# Patient Record
Sex: FEMALE | Race: WHITE | NOT HISPANIC OR LATINO
[De-identification: names, ages, dates, MRNs, and addresses within clinical notes are randomized per-mention and may not be internally consistent; named-entity substitution may affect disease eponyms.]

---

## 2018-07-02 ENCOUNTER — APPOINTMENT (OUTPATIENT)
Dept: ORTHOPEDIC SURGERY | Facility: CLINIC | Age: 63
End: 2018-07-02
Payer: COMMERCIAL

## 2018-07-02 VITALS
HEIGHT: 64 IN | DIASTOLIC BLOOD PRESSURE: 81 MMHG | SYSTOLIC BLOOD PRESSURE: 121 MMHG | BODY MASS INDEX: 32.44 KG/M2 | HEART RATE: 69 BPM | WEIGHT: 190 LBS

## 2018-07-02 PROCEDURE — 20611 DRAIN/INJ JOINT/BURSA W/US: CPT | Mod: RT

## 2018-08-06 ENCOUNTER — APPOINTMENT (OUTPATIENT)
Dept: ORTHOPEDIC SURGERY | Facility: CLINIC | Age: 63
End: 2018-08-06
Payer: COMMERCIAL

## 2018-08-06 VITALS
HEART RATE: 77 BPM | HEIGHT: 64 IN | DIASTOLIC BLOOD PRESSURE: 81 MMHG | BODY MASS INDEX: 32.44 KG/M2 | SYSTOLIC BLOOD PRESSURE: 122 MMHG | WEIGHT: 190 LBS

## 2018-08-06 DIAGNOSIS — Z82.61 FAMILY HISTORY OF ARTHRITIS: ICD-10-CM

## 2018-08-06 DIAGNOSIS — Z87.39 PERSONAL HISTORY OF OTHER DISEASES OF THE MUSCULOSKELETAL SYSTEM AND CONNECTIVE TISSUE: ICD-10-CM

## 2018-08-06 DIAGNOSIS — M54.5 LOW BACK PAIN: ICD-10-CM

## 2018-08-06 DIAGNOSIS — Z86.79 PERSONAL HISTORY OF OTHER DISEASES OF THE CIRCULATORY SYSTEM: ICD-10-CM

## 2018-08-06 PROCEDURE — 72100 X-RAY EXAM L-S SPINE 2/3 VWS: CPT

## 2018-08-06 PROCEDURE — 99214 OFFICE O/P EST MOD 30 MIN: CPT

## 2018-08-06 PROCEDURE — 73522 X-RAY EXAM HIPS BI 3-4 VIEWS: CPT

## 2018-08-06 RX ORDER — LISINOPRIL 10 MG/1
10 TABLET ORAL
Refills: 0 | Status: ACTIVE | COMMUNITY

## 2019-01-15 ENCOUNTER — APPOINTMENT (OUTPATIENT)
Dept: ORTHOPEDIC SURGERY | Facility: CLINIC | Age: 64
End: 2019-01-15
Payer: COMMERCIAL

## 2019-01-15 VITALS
HEIGHT: 64 IN | DIASTOLIC BLOOD PRESSURE: 75 MMHG | BODY MASS INDEX: 32.44 KG/M2 | SYSTOLIC BLOOD PRESSURE: 169 MMHG | WEIGHT: 190 LBS | HEART RATE: 90 BPM

## 2019-01-15 PROCEDURE — 73030 X-RAY EXAM OF SHOULDER: CPT | Mod: LT

## 2019-01-15 PROCEDURE — 20610 DRAIN/INJ JOINT/BURSA W/O US: CPT | Mod: LT

## 2019-01-15 PROCEDURE — 99213 OFFICE O/P EST LOW 20 MIN: CPT | Mod: 25

## 2019-01-15 RX ORDER — DICLOFENAC SODIUM 75 MG/1
75 TABLET, DELAYED RELEASE ORAL
Qty: 1 | Refills: 0 | Status: ACTIVE | COMMUNITY
Start: 2019-01-15 | End: 1900-01-01

## 2019-01-15 NOTE — PHYSICAL EXAM
[UE/LE] : Sensory: Intact in bilateral upper & lower extremities [ALL] : dorsalis pedis, posterior tibial, femoral, popliteal, and radial 2+ and symmetric bilaterally [Normal] : Oriented to person, place, and time, insight and judgement were intact and the affect was normal [Poor Appearance] : well-appearing [Acute Distress] : not in acute distress [de-identified] : Skin Warm, dry, no erythema, no warmth, no swelling, no lesions\par \par Tenderness - lateral shoulder\par \par ROM \par Flexion -120 degrees\par Abduction 120 degrees\par \par Neer Test - Negative \par Peterson Test - Negative\par Cross Body Adduction Test - Negative \par Speed Test - Negative \par Yergason Sign - Negative \par O'briens Test - Negative \par Supraspinatus Stress Test - pain  \par Drop Arm Test - Negative \par External Rotation Strength - Negative \par Lift - Off Test Negative \par \par Sensation to touch intact to lateral to axillary and musculocutaneous nerve, distal motor function intact to elbow, wrist and hand \par pulse intact, cap refill intact\par  [de-identified] : 3 view left shoulder xray reveals no acute findings

## 2019-01-15 NOTE — PROCEDURE
[de-identified] : After full discussion of treatment alternatives, and associated risks, complication, limitations, and expectations, and with patient consent, a steroid injection was administered. Following sterile prep with sterile method, 40 mg of Depo-Medrol + 5 cc of 1% lidocaine were injected into the subacromial space of the left shoulder and was well tolerated without complication.\par

## 2019-01-15 NOTE — DISCUSSION/SUMMARY
[de-identified] : Discussion had with patient, will start NSAIDS \par Patient will follow up with sports after PT\par Patient aware must follow up with MD for further eval and treatment \par Patient will start Physical Therapy \par Patients questions were answered and patient is satisfied with today's visit\par

## 2019-01-15 NOTE — HISTORY OF PRESENT ILLNESS
[Pain Location] : pain [de-identified] : Patient is a 63 year old female who presents c/o left shoulder pain x 2-3 weeks. patient denies any injury that started pain. patient locates pain to lateral shoulder, states feels pain with motion. no radiation of pain,no weakness. patient has not had any treatment to this point

## 2019-02-18 ENCOUNTER — APPOINTMENT (OUTPATIENT)
Dept: ORTHOPEDIC SURGERY | Facility: CLINIC | Age: 64
End: 2019-02-18
Payer: COMMERCIAL

## 2019-02-18 ENCOUNTER — RESULT REVIEW (OUTPATIENT)
Age: 64
End: 2019-02-18

## 2019-02-18 VITALS — BODY MASS INDEX: 32.44 KG/M2 | WEIGHT: 190 LBS | HEIGHT: 64 IN

## 2019-02-18 PROCEDURE — 99214 OFFICE O/P EST MOD 30 MIN: CPT

## 2019-02-20 ENCOUNTER — FORM ENCOUNTER (OUTPATIENT)
Age: 64
End: 2019-02-20

## 2019-02-21 ENCOUNTER — APPOINTMENT (OUTPATIENT)
Dept: MRI IMAGING | Facility: CLINIC | Age: 64
End: 2019-02-21
Payer: COMMERCIAL

## 2019-02-21 ENCOUNTER — OUTPATIENT (OUTPATIENT)
Dept: OUTPATIENT SERVICES | Facility: HOSPITAL | Age: 64
LOS: 1 days | End: 2019-02-21
Payer: COMMERCIAL

## 2019-02-21 DIAGNOSIS — Z00.8 ENCOUNTER FOR OTHER GENERAL EXAMINATION: ICD-10-CM

## 2019-02-21 PROCEDURE — 73221 MRI JOINT UPR EXTREM W/O DYE: CPT

## 2019-02-21 PROCEDURE — 73221 MRI JOINT UPR EXTREM W/O DYE: CPT | Mod: 26,LT

## 2019-02-28 ENCOUNTER — APPOINTMENT (OUTPATIENT)
Dept: ORTHOPEDIC SURGERY | Facility: CLINIC | Age: 64
End: 2019-02-28
Payer: COMMERCIAL

## 2019-02-28 ENCOUNTER — TRANSCRIPTION ENCOUNTER (OUTPATIENT)
Age: 64
End: 2019-02-28

## 2019-02-28 VITALS — WEIGHT: 190 LBS | BODY MASS INDEX: 32.44 KG/M2 | HEIGHT: 64 IN

## 2019-02-28 DIAGNOSIS — M75.80 OTHER SHOULDER LESIONS, UNSPECIFIED SHOULDER: ICD-10-CM

## 2019-02-28 DIAGNOSIS — S82.841A DISPLACED BIMALLEOLAR FRACTURE OF RIGHT LOWER LEG, INITIAL ENCOUNTER FOR CLOSED FRACTURE: ICD-10-CM

## 2019-02-28 DIAGNOSIS — M75.02 ADHESIVE CAPSULITIS OF LEFT SHOULDER: ICD-10-CM

## 2019-02-28 PROCEDURE — ZZZZZ: CPT

## 2019-02-28 PROCEDURE — 73610 X-RAY EXAM OF ANKLE: CPT | Mod: RT

## 2019-02-28 PROCEDURE — 73562 X-RAY EXAM OF KNEE 3: CPT | Mod: RT

## 2019-02-28 PROCEDURE — 29425 APPL SHORT LEG CAST WALKING: CPT | Mod: 59,RT

## 2019-02-28 PROCEDURE — 20611 DRAIN/INJ JOINT/BURSA W/US: CPT | Mod: 59,LT

## 2019-02-28 PROCEDURE — 99214 OFFICE O/P EST MOD 30 MIN: CPT | Mod: 25,57

## 2019-02-28 NOTE — PHYSICAL EXAM
[de-identified] : Constitutional\par o Appearance : well-nourished, well developed, alert, in no acute distress \par Head and Face\par o Head :\par ¦ Inspection : atraumatic, normocephalic\par o Face :\par ¦ Inspection : no visible rash or discoloration\par Lymphatic\par o Epitrochlear Nodes : no lymphadenopathy \par o Popliteal Nodes : no palpable nodes present \par o Supraclavicular Nodes : no supraclavicular nodes present \par o Preauricular Nodes : no preauricular nodes present \par o Additional Nodes : no additional adenopathy present \par Skin and Subcutaneous Tissue \par o Head and Neck :\par ¦ Face : no facial lesions \par o Trunk :\par ¦ Back : no rashes present, no lesions present, no areas of discoloration \par Body Hair : body hair distribution normal for age\par Neurologic\par o Mental Status Examination :\par ¦ Orientation : alert and oriented X 3\par o Coordination : finger-to-nose-to-finger testing normal bilaterally, heel-shin cerebellar test within normal limits bilaterally \par Psychiatric\par o Mood and Affect: mood normal, affect appropriate \par Cardiovascular\par o Peripheral Vascular System :\par ¦ Femoral Artery : pulse 2+\par ¦ Dorsalis Pedis Artery : pulse 2+\par ¦ Posterior Tibial Artery : pulse 2+\par \par Right Lower Extremity\par o Knee :\par ¦ Inspection/Palpation : significant medial compartment tenderness to palpation, no swelling\par ¦ Range of Motion : active flexion and extension full and painless, no crepitance\par ¦ Stability : no valgus or varus instability present on provocative testing\par ¦ Strength : flexion and extension 4/5\par ¦ Tests and Signs : all tests for stability normal \par \par o Ankle :\par ¦ Inspection/Palpation : medial and lateral clearspace tenderness to palpation, early ecchymosis of the ankle\par ¦ Range of Motion : pain with range of motion, full motion of her toes\par ¦ Stability : no joint instability on provocative testing\par ¦ Strength : strength not tested\par o Skin : no erythema or ecchymosis present\par o Sensation : sensation to pin intact \par \par Left Lower Extremity\par o Knee :\par ¦ Inspection/Palpation : no tenderness to palpation, no swelling\par ¦ Range of Motion : active flexion and extension full and painless, no crepitance\par ¦ Stability : no valgus or varus instability present on provocative testing\par ¦ Strength : flexion and extension 5/5\par ¦ Tests and Signs : all tests for stability normal \par  \par o Ankle :\par no swelling all planes\par ¦ Inspection/Palpation : no tenderness to palpation,\par ¦ Range of Motion : arc of motion full and painless in\par ¦ Stability : no joint instability on provocative testing\par ¦ Strength : all muscles 5/5\par o Skin : no erythema or ecchymosis present\par o Sensation : sensation to pin intact \par \par Gait and Station:\par Gait: walks with a cane in the left hand, swelling about the right ankle\par  antalgic on the right.\par Radiology:\par o Right Knee: Standing AP, lateral, and tunnel views were obtained and reveal severe bone-on-bone in the medial compartment with severe patellofemoral osteoarthritis . There is no evidence of fracture.\par o Right Ankle : AP, lateral and Mortise views were obtained and reveal evulsion of the medial malleolus and a lateral malleolus fracture\par

## 2019-02-28 NOTE — HISTORY OF PRESENT ILLNESS
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 She suffered a twisting of her right ankle and injured her right knee. She can put weight onto her right leg, but it is painful with full weight-bearing. She immediately noticed swelling about the ankle after the fall. She is unable to comfortably drive and has been walking with a cane in her left hand. She is feeling pain in her right knee and ankle and is concerned about a fracture.

## 2019-02-28 NOTE — DISCUSSION/SUMMARY
[de-identified] : For her ankle fracture, she will see Dr. Wiley for an ORIF of her right ankle in 1 day in the ER. She has consented to the surgery. The risks and benefits of the procedure was discussed at length with her. In the interim, she needs to elevate her right leg and apply ice to her ankle. She may take Tylenol for pain, but no steroidal anti-inflammatories. A posterior mold was provided and she will continue to wear it until she can get her fracture reduced. She is to limit her walking.

## 2019-02-28 NOTE — ADDENDUM
[FreeTextEntry1] : I, Jose Sim , acted solely as a scribe for Dr. Aron Gutiérrez on this date 02/28/2019.\par All medical record entries made by the Scribe were at my, Dr. Aorn Gutiérrez, direction and personally dictated by me on 02/28/2019. I have reviewed the chart and agree that the record accurately reflects my personal performance of the history, physical exam, assessment and plan. I have also personally directed, reviewed, and agreed with the chart.

## 2019-03-01 ENCOUNTER — INPATIENT (INPATIENT)
Facility: HOSPITAL | Age: 64
LOS: 1 days | Discharge: HOME | DRG: 494 | End: 2019-03-03
Attending: SPECIALIST | Admitting: SPECIALIST
Payer: COMMERCIAL

## 2019-03-01 VITALS
WEIGHT: 190.04 LBS | DIASTOLIC BLOOD PRESSURE: 68 MMHG | RESPIRATION RATE: 18 BRPM | OXYGEN SATURATION: 98 % | HEART RATE: 77 BPM | SYSTOLIC BLOOD PRESSURE: 164 MMHG | HEIGHT: 64 IN | TEMPERATURE: 98 F

## 2019-03-01 DIAGNOSIS — Z98.890 OTHER SPECIFIED POSTPROCEDURAL STATES: Chronic | ICD-10-CM

## 2019-03-01 DIAGNOSIS — S82.891A OTHER FRACTURE OF RIGHT LOWER LEG, INITIAL ENCOUNTER FOR CLOSED FRACTURE: ICD-10-CM

## 2019-03-01 LAB
ALLERGY+IMMUNOLOGY DIAG STUDY NOTE: SIGNIFICANT CHANGE UP
ANION GAP SERPL CALC-SCNC: 11 MMOL/L — SIGNIFICANT CHANGE UP (ref 5–17)
APTT BLD: 28.3 SEC — LOW (ref 28.5–37)
BASOPHILS # BLD AUTO: 0.02 K/UL — SIGNIFICANT CHANGE UP (ref 0–0.2)
BASOPHILS NFR BLD AUTO: 0.1 % — SIGNIFICANT CHANGE UP (ref 0–2)
BUN SERPL-MCNC: 23 MG/DL — SIGNIFICANT CHANGE UP (ref 7–23)
CALCIUM SERPL-MCNC: 9.4 MG/DL — SIGNIFICANT CHANGE UP (ref 8.4–10.5)
CHLORIDE SERPL-SCNC: 104 MMOL/L — SIGNIFICANT CHANGE UP (ref 96–108)
CO2 SERPL-SCNC: 26 MMOL/L — SIGNIFICANT CHANGE UP (ref 22–31)
CREAT SERPL-MCNC: 0.78 MG/DL — SIGNIFICANT CHANGE UP (ref 0.5–1.3)
EOSINOPHIL # BLD AUTO: 0.01 K/UL — SIGNIFICANT CHANGE UP (ref 0–0.5)
EOSINOPHIL NFR BLD AUTO: 0.1 % — SIGNIFICANT CHANGE UP (ref 0–6)
GLUCOSE SERPL-MCNC: 129 MG/DL — HIGH (ref 70–99)
HCT VFR BLD CALC: 40.5 % — SIGNIFICANT CHANGE UP (ref 34.5–45)
HGB BLD-MCNC: 14 G/DL — SIGNIFICANT CHANGE UP (ref 11.5–15.5)
IMM GRANULOCYTES NFR BLD AUTO: 0.3 % — SIGNIFICANT CHANGE UP (ref 0–1.5)
INR BLD: 1.19 RATIO — HIGH (ref 0.88–1.16)
LYMPHOCYTES # BLD AUTO: 1.85 K/UL — SIGNIFICANT CHANGE UP (ref 1–3.3)
LYMPHOCYTES # BLD AUTO: 12.9 % — LOW (ref 13–44)
MCHC RBC-ENTMCNC: 32.9 PG — SIGNIFICANT CHANGE UP (ref 27–34)
MCHC RBC-ENTMCNC: 34.6 GM/DL — SIGNIFICANT CHANGE UP (ref 32–36)
MCV RBC AUTO: 95.1 FL — SIGNIFICANT CHANGE UP (ref 80–100)
MONOCYTES # BLD AUTO: 0.76 K/UL — SIGNIFICANT CHANGE UP (ref 0–0.9)
MONOCYTES NFR BLD AUTO: 5.3 % — SIGNIFICANT CHANGE UP (ref 2–14)
NEUTROPHILS # BLD AUTO: 11.64 K/UL — HIGH (ref 1.8–7.4)
NEUTROPHILS NFR BLD AUTO: 81.3 % — HIGH (ref 43–77)
NRBC # BLD: 0 /100 WBCS — SIGNIFICANT CHANGE UP (ref 0–0)
PLATELET # BLD AUTO: 306 K/UL — SIGNIFICANT CHANGE UP (ref 150–400)
POTASSIUM SERPL-MCNC: 4.1 MMOL/L — SIGNIFICANT CHANGE UP (ref 3.5–5.3)
POTASSIUM SERPL-SCNC: 4.1 MMOL/L — SIGNIFICANT CHANGE UP (ref 3.5–5.3)
PROTHROM AB SERPL-ACNC: 13 SEC — HIGH (ref 10–12.9)
RBC # BLD: 4.26 M/UL — SIGNIFICANT CHANGE UP (ref 3.8–5.2)
RBC # FLD: 12.2 % — SIGNIFICANT CHANGE UP (ref 10.3–14.5)
SODIUM SERPL-SCNC: 141 MMOL/L — SIGNIFICANT CHANGE UP (ref 135–145)
WBC # BLD: 14.33 K/UL — HIGH (ref 3.8–10.5)
WBC # FLD AUTO: 14.33 K/UL — HIGH (ref 3.8–10.5)

## 2019-03-01 PROCEDURE — 93010 ELECTROCARDIOGRAM REPORT: CPT

## 2019-03-01 PROCEDURE — 99285 EMERGENCY DEPT VISIT HI MDM: CPT

## 2019-03-01 PROCEDURE — 99223 1ST HOSP IP/OBS HIGH 75: CPT

## 2019-03-01 PROCEDURE — 27814 TREATMENT OF ANKLE FRACTURE: CPT | Mod: RT

## 2019-03-01 PROCEDURE — 71045 X-RAY EXAM CHEST 1 VIEW: CPT | Mod: 26

## 2019-03-01 RX ORDER — OXYCODONE AND ACETAMINOPHEN 5; 325 MG/1; MG/1
1 TABLET ORAL EVERY 4 HOURS
Qty: 0 | Refills: 0 | Status: DISCONTINUED | OUTPATIENT
Start: 2019-03-01 | End: 2019-03-01

## 2019-03-01 RX ORDER — SODIUM CHLORIDE 9 MG/ML
1000 INJECTION, SOLUTION INTRAVENOUS
Qty: 0 | Refills: 0 | Status: DISCONTINUED | OUTPATIENT
Start: 2019-03-01 | End: 2019-03-01

## 2019-03-01 RX ORDER — CELECOXIB 200 MG/1
200 CAPSULE ORAL EVERY 12 HOURS
Qty: 0 | Refills: 0 | Status: DISCONTINUED | OUTPATIENT
Start: 2019-03-01 | End: 2019-03-03

## 2019-03-01 RX ORDER — HYDROMORPHONE HYDROCHLORIDE 2 MG/ML
0.5 INJECTION INTRAMUSCULAR; INTRAVENOUS; SUBCUTANEOUS EVERY 6 HOURS
Qty: 0 | Refills: 0 | Status: DISCONTINUED | OUTPATIENT
Start: 2019-03-01 | End: 2019-03-03

## 2019-03-01 RX ORDER — ONDANSETRON 8 MG/1
4 TABLET, FILM COATED ORAL ONCE
Qty: 0 | Refills: 0 | Status: DISCONTINUED | OUTPATIENT
Start: 2019-03-01 | End: 2019-03-01

## 2019-03-01 RX ORDER — HYDROMORPHONE HYDROCHLORIDE 2 MG/ML
0.5 INJECTION INTRAMUSCULAR; INTRAVENOUS; SUBCUTANEOUS
Qty: 0 | Refills: 0 | Status: DISCONTINUED | OUTPATIENT
Start: 2019-03-01 | End: 2019-03-01

## 2019-03-01 RX ORDER — MORPHINE SULFATE 50 MG/1
4 CAPSULE, EXTENDED RELEASE ORAL ONCE
Qty: 0 | Refills: 0 | Status: DISCONTINUED | OUTPATIENT
Start: 2019-03-01 | End: 2019-03-01

## 2019-03-01 RX ORDER — OXYCODONE HYDROCHLORIDE 5 MG/1
10 TABLET ORAL EVERY 4 HOURS
Qty: 0 | Refills: 0 | Status: DISCONTINUED | OUTPATIENT
Start: 2019-03-01 | End: 2019-03-03

## 2019-03-01 RX ORDER — OXYCODONE HYDROCHLORIDE 5 MG/1
5 TABLET ORAL EVERY 4 HOURS
Qty: 0 | Refills: 0 | Status: DISCONTINUED | OUTPATIENT
Start: 2019-03-01 | End: 2019-03-03

## 2019-03-01 RX ORDER — APREPITANT 80 MG/1
40 CAPSULE ORAL ONCE
Qty: 0 | Refills: 0 | Status: COMPLETED | OUTPATIENT
Start: 2019-03-01 | End: 2019-03-01

## 2019-03-01 RX ORDER — ACETAMINOPHEN 500 MG
1000 TABLET ORAL ONCE
Qty: 0 | Refills: 0 | Status: COMPLETED | OUTPATIENT
Start: 2019-03-01 | End: 2019-03-01

## 2019-03-01 RX ORDER — SODIUM CHLORIDE 9 MG/ML
3 INJECTION INTRAMUSCULAR; INTRAVENOUS; SUBCUTANEOUS ONCE
Qty: 0 | Refills: 0 | Status: COMPLETED | OUTPATIENT
Start: 2019-03-01 | End: 2019-03-01

## 2019-03-01 RX ORDER — DOCUSATE SODIUM 100 MG
100 CAPSULE ORAL THREE TIMES A DAY
Qty: 0 | Refills: 0 | Status: DISCONTINUED | OUTPATIENT
Start: 2019-03-01 | End: 2019-03-03

## 2019-03-01 RX ORDER — CEFAZOLIN SODIUM 1 G
2000 VIAL (EA) INJECTION EVERY 8 HOURS
Qty: 0 | Refills: 0 | Status: COMPLETED | OUTPATIENT
Start: 2019-03-02 | End: 2019-03-02

## 2019-03-01 RX ORDER — ENOXAPARIN SODIUM 100 MG/ML
40 INJECTION SUBCUTANEOUS EVERY 24 HOURS
Qty: 0 | Refills: 0 | Status: DISCONTINUED | OUTPATIENT
Start: 2019-03-02 | End: 2019-03-03

## 2019-03-01 RX ORDER — SODIUM CHLORIDE 9 MG/ML
1000 INJECTION, SOLUTION INTRAVENOUS
Qty: 0 | Refills: 0 | Status: DISCONTINUED | OUTPATIENT
Start: 2019-03-01 | End: 2019-03-03

## 2019-03-01 RX ORDER — ONDANSETRON 8 MG/1
4 TABLET, FILM COATED ORAL EVERY 6 HOURS
Qty: 0 | Refills: 0 | Status: DISCONTINUED | OUTPATIENT
Start: 2019-03-01 | End: 2019-03-03

## 2019-03-01 RX ORDER — LISINOPRIL 2.5 MG/1
10 TABLET ORAL DAILY
Qty: 0 | Refills: 0 | Status: DISCONTINUED | OUTPATIENT
Start: 2019-03-01 | End: 2019-03-03

## 2019-03-01 RX ORDER — CHLORHEXIDINE GLUCONATE 213 G/1000ML
1 SOLUTION TOPICAL DAILY
Qty: 0 | Refills: 0 | Status: DISCONTINUED | OUTPATIENT
Start: 2019-03-01 | End: 2019-03-03

## 2019-03-01 RX ORDER — ALPRAZOLAM 0.25 MG
1 TABLET ORAL
Qty: 0 | Refills: 0 | COMMUNITY

## 2019-03-01 RX ORDER — CEFAZOLIN SODIUM 1 G
2000 VIAL (EA) INJECTION ONCE
Qty: 0 | Refills: 0 | Status: COMPLETED | OUTPATIENT
Start: 2019-03-01 | End: 2019-03-01

## 2019-03-01 RX ORDER — SODIUM CHLORIDE 9 MG/ML
1000 INJECTION, SOLUTION INTRAVENOUS
Qty: 0 | Refills: 0 | Status: DISCONTINUED | OUTPATIENT
Start: 2019-03-01 | End: 2019-03-02

## 2019-03-01 RX ORDER — ALPRAZOLAM 0.25 MG
0.25 TABLET ORAL THREE TIMES A DAY
Qty: 0 | Refills: 0 | Status: DISCONTINUED | OUTPATIENT
Start: 2019-03-01 | End: 2019-03-03

## 2019-03-01 RX ADMIN — MORPHINE SULFATE 4 MILLIGRAM(S): 50 CAPSULE, EXTENDED RELEASE ORAL at 09:36

## 2019-03-01 RX ADMIN — SODIUM CHLORIDE 125 MILLILITER(S): 9 INJECTION, SOLUTION INTRAVENOUS at 18:37

## 2019-03-01 RX ADMIN — Medication 100 MILLIGRAM(S): at 20:21

## 2019-03-01 RX ADMIN — SODIUM CHLORIDE 120 MILLILITER(S): 9 INJECTION, SOLUTION INTRAVENOUS at 09:36

## 2019-03-01 RX ADMIN — CELECOXIB 200 MILLIGRAM(S): 200 CAPSULE ORAL at 20:20

## 2019-03-01 RX ADMIN — APREPITANT 40 MILLIGRAM(S): 80 CAPSULE ORAL at 14:52

## 2019-03-01 RX ADMIN — CELECOXIB 200 MILLIGRAM(S): 200 CAPSULE ORAL at 20:21

## 2019-03-01 RX ADMIN — CHLORHEXIDINE GLUCONATE 1 APPLICATION(S): 213 SOLUTION TOPICAL at 14:52

## 2019-03-01 RX ADMIN — MORPHINE SULFATE 4 MILLIGRAM(S): 50 CAPSULE, EXTENDED RELEASE ORAL at 14:00

## 2019-03-01 RX ADMIN — SODIUM CHLORIDE 120 MILLILITER(S): 9 INJECTION, SOLUTION INTRAVENOUS at 09:25

## 2019-03-01 RX ADMIN — SODIUM CHLORIDE 3 MILLILITER(S): 9 INJECTION INTRAMUSCULAR; INTRAVENOUS; SUBCUTANEOUS at 09:25

## 2019-03-01 NOTE — H&P ADULT - NSHPPHYSICALEXAM_GEN_ALL_CORE
Right ankle: + tenderness medially and laterally tibiotalar joint anteriorly. Early ecchymosis. ROM limited by pain.  FROM of toes.  Brisk capillary refill all digits. Sensation intact to LT.

## 2019-03-01 NOTE — ED ADULT NURSE NOTE - NSIMPLEMENTINTERV_GEN_ALL_ED
Implemented All Fall Risk Interventions:  [place] to call system. Call bell, personal items and telephone within reach. Instruct patient to call for assistance. Room bathroom lighting operational. Non-slip footwear when patient is off stretcher. Physically safe environment: no spills, clutter or unnecessary equipment. Stretcher in lowest position, wheels locked, appropriate side rails in place. Provide visual cue, wrist band, yellow gown, etc. Monitor gait and stability. Monitor for mental status changes and reorient to person, place, and time. Review medications for side effects contributing to fall risk. Reinforce activity limits and safety measures with patient and family.

## 2019-03-01 NOTE — H&P ADULT - HISTORY OF PRESENT ILLNESS
This is a 63 y.o. female who slipped yesterday on ice, while getting into her car, twisting her right ankle.  She went to Dr. Gutiérrez's office afterwards, where x-rays revealed a bimalleolar ankle fx.  She was seen by both Dr. Gutiérrez and Dr. Wiley at the office and was scheduled for ORIF of the right ankle today.

## 2019-03-01 NOTE — ED PROVIDER NOTE - OBJECTIVE STATEMENT
pt s/p trip and fall yesterday, dx with R ankle fracture pt s/p trip and fall yesterday, dx with R ankle fracture yesterday in Dr. Gutiérrez's office, sent in today for surgery of her R ankle by Dr. Wiley.  pt denies pain when not moving, pt states she took the splint off today on her won accord.

## 2019-03-01 NOTE — CONSULT NOTE ADULT - ASSESSMENT
This is a 63 y.o. female who slipped yesterday on ice, while getting into her car, twisting her right ankle.  She went to Dr. Gutiérrez's office afterwards, where x-rays revealed a bimalleolar ankle fx.  She was seen by both Dr. Gutiérrez and Dr. Wiley at the office and was scheduled for ORIF of the right ankle today. Virgilio any LOC, chest pain, palpitation, headache, dizziness. Patient states she did not hit his head.  Patient can walk a long distance without sob.    Closed fracture of right ankle initial encounter, s/p fall    pain meds. DVT prophylaxis as per protocol.  for ORIF of ankle today.    HTN  lisinopril with parameter.    Anxiety  xanax PRN.    Patient is medically optimized with intermediate risk  for surgery today.

## 2019-03-01 NOTE — ED ADULT NURSE REASSESSMENT NOTE - NS ED NURSE REASSESS COMMENT FT1
pt comfortable offers no c/o at present pt pending or call  sister at bedside  to take belongings
pains better after pain meds npo maintained and awaiting or call pt offers no other c/o at present

## 2019-03-01 NOTE — ED PROVIDER NOTE - PHYSICAL EXAMINATION
Gen:  alert, awake, no acute distress  HEENT:  atraumatic head, airway clear, pupils equal and round  CV:  rrr, nl S1, S2, no m/r/g  Pulm:  lungs CTA b/l  Abd: s/nt/nd, +BS  Ext:  moving all extremities, mild R ankle swelling  Neuro:  grossly intact, no focal deficits  Skin:  clear, dry, intact  Psych: AOx3, normal affect, no apparent risk to self or others

## 2019-03-01 NOTE — BRIEF OPERATIVE NOTE - PROCEDURE
<<-----Click on this checkbox to enter Procedure Ankle fracture surgery  03/01/2019  ORIF  Active  COLEEN

## 2019-03-01 NOTE — CONSULT NOTE ADULT - SUBJECTIVE AND OBJECTIVE BOX
Patient is a 63y old  Female who presents with a chief complaint of Right ankle fx (01 Mar 2019 10:24)    This is a 63 y.o. female who slipped yesterday on ice, while getting into her car, twisting her right ankle.  She went to Dr. Gutiérrez's office afterwards, where x-rays revealed a bimalleolar ankle fx.  She was seen by both Dr. Gutiérrez and Dr. Wiley at the office and was scheduled for ORIF of the right ankle today. Virgilio any LOC, chest pain, palpitation, headache, dizziness. Patient states she did not hit his head.      HPI:  Patient is seen and examined.  Virgilio any LOC, chest pain, palpitation, headache, dizziness.  c/o pain in right ankle. h/o right ankle surgery in past. Virgilio any other injury.     PAST MEDICAL & SURGICAL HISTORY:  Anxiety  Essential hypertension  S/P right knee arthroscopy      MEDICATIONS  (STANDING):  dextrose 5% + sodium chloride 0.45%. 1000 milliLiter(s) (120 mL/Hr) IV Continuous <Continuous>    MEDICATIONS  (PRN):      Allergies    No Known Allergies    Intolerances  SOCIAL HISTORY:  Smoker:   NO        PACK YEARS:                         WHEN QUIT?  ETOH use:  YES /social               FREQUENCY / QUANTITY:  Ilicit Drug use:   NO      FAMILY HISTORY:  Mother -A fib.s      Vital Signs Last 24 Hrs  T(C): 36.9 (01 Mar 2019 09:08), Max: 36.9 (01 Mar 2019 08:41)  T(F): 98.4 (01 Mar 2019 09:08), Max: 98.4 (01 Mar 2019 08:41)  HR: 72 (01 Mar 2019 10:03) (72 - 77)  BP: 150/72 (01 Mar 2019 10:03) (150/72 - 164/68)  BP(mean): --  RR: 17 (01 Mar 2019 10:03) (17 - 18)  SpO2: 97% (01 Mar 2019 10:03) (97% - 98%)      LABS:                        14.0   14.33 )-----------( 306      ( 01 Mar 2019 09:03 )             40.5     03-01    141  |  104  |  23  ----------------------------<  129<H>  4.1   |  26  |  0.78    Ca    9.4      01 Mar 2019 09:03      PT/INR - ( 01 Mar 2019 09:03 )   PT: 13.0 sec;   INR: 1.19 ratio         PTT - ( 01 Mar 2019 09:03 )  PTT:28.3 sec    < from: Xray Chest 1 View- PORTABLE-Urgent (03.01.19 @ 09:02) >  EXAM:  XR CHEST PORTABLE URGENT 1V      < end of copied text >  < from: Xray Chest 1 View- PORTABLE-Urgent (03.01.19 @ 09:02) >  MPRESSION:       The lungs are clear. There is no pleural effusion. There is no   pneumothorax.  The cardiac silhouette is within normal limits.  There is   no acute osseous abnormality.    EKG- NSR, no acute ST T wave changes.

## 2019-03-01 NOTE — PRE-OP CHECKLIST - SPO2 (%)
98 I personally performed the services described in the documentation, reviewed the documentation recorded by the scribe in my presence and it accurately and completely records my words and action.

## 2019-03-02 ENCOUNTER — TRANSCRIPTION ENCOUNTER (OUTPATIENT)
Age: 64
End: 2019-03-02

## 2019-03-02 LAB
HCV AB S/CO SERPL IA: 0.2 S/CO — SIGNIFICANT CHANGE UP (ref 0–0.79)
HCV AB SERPL-IMP: SIGNIFICANT CHANGE UP

## 2019-03-02 PROCEDURE — 99233 SBSQ HOSP IP/OBS HIGH 50: CPT

## 2019-03-02 RX ADMIN — Medication 30 MILLILITER(S): at 19:50

## 2019-03-02 RX ADMIN — CELECOXIB 200 MILLIGRAM(S): 200 CAPSULE ORAL at 08:15

## 2019-03-02 RX ADMIN — OXYCODONE HYDROCHLORIDE 5 MILLIGRAM(S): 5 TABLET ORAL at 06:15

## 2019-03-02 RX ADMIN — OXYCODONE HYDROCHLORIDE 5 MILLIGRAM(S): 5 TABLET ORAL at 10:50

## 2019-03-02 RX ADMIN — CELECOXIB 200 MILLIGRAM(S): 200 CAPSULE ORAL at 08:16

## 2019-03-02 RX ADMIN — Medication 100 MILLIGRAM(S): at 08:14

## 2019-03-02 RX ADMIN — ENOXAPARIN SODIUM 40 MILLIGRAM(S): 100 INJECTION SUBCUTANEOUS at 08:15

## 2019-03-02 RX ADMIN — OXYCODONE HYDROCHLORIDE 5 MILLIGRAM(S): 5 TABLET ORAL at 09:54

## 2019-03-02 RX ADMIN — CELECOXIB 200 MILLIGRAM(S): 200 CAPSULE ORAL at 21:14

## 2019-03-02 RX ADMIN — Medication 100 MILLIGRAM(S): at 05:22

## 2019-03-02 RX ADMIN — OXYCODONE HYDROCHLORIDE 5 MILLIGRAM(S): 5 TABLET ORAL at 14:18

## 2019-03-02 RX ADMIN — OXYCODONE HYDROCHLORIDE 5 MILLIGRAM(S): 5 TABLET ORAL at 19:02

## 2019-03-02 RX ADMIN — OXYCODONE HYDROCHLORIDE 5 MILLIGRAM(S): 5 TABLET ORAL at 15:15

## 2019-03-02 RX ADMIN — OXYCODONE HYDROCHLORIDE 5 MILLIGRAM(S): 5 TABLET ORAL at 19:48

## 2019-03-02 RX ADMIN — OXYCODONE HYDROCHLORIDE 10 MILLIGRAM(S): 5 TABLET ORAL at 23:12

## 2019-03-02 RX ADMIN — Medication 100 MILLIGRAM(S): at 21:14

## 2019-03-02 RX ADMIN — OXYCODONE HYDROCHLORIDE 5 MILLIGRAM(S): 5 TABLET ORAL at 05:29

## 2019-03-02 RX ADMIN — Medication 100 MILLIGRAM(S): at 14:18

## 2019-03-02 RX ADMIN — Medication 100 MILLIGRAM(S): at 00:07

## 2019-03-02 RX ADMIN — CELECOXIB 200 MILLIGRAM(S): 200 CAPSULE ORAL at 21:12

## 2019-03-02 NOTE — PROGRESS NOTE ADULT - SUBJECTIVE AND OBJECTIVE BOX
Patient is a 63y old  Female who presents with a chief complaint of Right ankle fx (01 Mar 2019 12:24)      INTERVAL HPI/OVERNIGHT EVENTS:  Pt is seen and examined.  Pain is controlled.    Pain Location & Control:     MEDICATIONS  (STANDING):  celecoxib 200 milliGRAM(s) Oral every 12 hours  chlorhexidine 2% Cloths 1 Application(s) Topical daily  dextrose 5% + sodium chloride 0.45%. 1000 milliLiter(s) (120 mL/Hr) IV Continuous <Continuous>  docusate sodium 100 milliGRAM(s) Oral three times a day  enoxaparin Injectable 40 milliGRAM(s) SubCutaneous every 24 hours  lactated ringers. 1000 milliLiter(s) (125 mL/Hr) IV Continuous <Continuous>  lisinopril 10 milliGRAM(s) Oral daily    MEDICATIONS  (PRN):  ALPRAZolam 0.25 milliGRAM(s) Oral three times a day PRN Anxiety  aluminum hydroxide/magnesium hydroxide/simethicone Suspension 30 milliLiter(s) Oral four times a day PRN Indigestion  HYDROmorphone  Injectable 0.5 milliGRAM(s) IV Push every 6 hours PRN Severe Pain (7 - 10)  ondansetron Injectable 4 milliGRAM(s) IV Push every 6 hours PRN Nausea and/or Vomiting  oxyCODONE    IR 10 milliGRAM(s) Oral every 4 hours PRN Moderate Pain (4 - 6)  oxyCODONE    IR 5 milliGRAM(s) Oral every 4 hours PRN Mild Pain (1 - 3)      Allergies    No Known Allergies    Intolerances    Vital Signs Last 24 Hrs  T(C): 36.9 (02 Mar 2019 08:31), Max: 37.1 (01 Mar 2019 18:04)  T(F): 98.4 (02 Mar 2019 08:31), Max: 98.8 (01 Mar 2019 23:30)  HR: 64 (02 Mar 2019 08:31) (62 - 82)  BP: 131/71 (02 Mar 2019 08:31) (95/56 - 142/85)  BP(mean): --  RR: 16 (02 Mar 2019 08:31) (13 - 17)  SpO2: 94% (02 Mar 2019 08:31) (92% - 98%)        LABS:      Ca    9.4        01 Mar 2019 09:03      PT/INR - ( 01 Mar 2019 09:03 )   PT: 13.0 sec;   INR: 1.19 ratio         PTT - ( 01 Mar 2019 09:03 )  PTT:28.3 sec    CAPILLARY BLOOD GLUCOSE        RADIOLOGY & ADDITIONAL TESTS:    Imaging Personally Reviewed:  [ ] YES  [ ] NO    Consultant(s) Notes Reviewed:  [ x] YES  [ ] NO    Care Discussed with Consultants/Other Providers [x ] YES  [ ] NO

## 2019-03-02 NOTE — DISCHARGE NOTE ADULT - CARE PROVIDER_API CALL
Fox Wiley)  Orthopaedic Surgery  [address]  [place] [address]  Phone: [phone]  Fax: [phone]  Follow Up Time:

## 2019-03-02 NOTE — PROGRESS NOTE ADULT - SUBJECTIVE AND OBJECTIVE BOX
Post Op Day # 1    SUBJECTIVE    64yo Female s/p ORIF right ankle  Patient is alert and comfortable.  Pain well controlled on current pain regimen.  Denies chest pain, shortness of breath, nausea, vomiting.  No complaints at this time.    OBJECTIVE    Vital Signs Last 24 Hrs  T(C): 36.9 (02 Mar 2019 08:31), Max: 37.1 (01 Mar 2019 18:04)  T(F): 98.4 (02 Mar 2019 08:31), Max: 98.8 (01 Mar 2019 23:30)  HR: 64 (02 Mar 2019 08:31) (62 - 82)  BP: 131/71 (02 Mar 2019 08:31) (95/56 - 142/85)  BP(mean): --  RR: 16 (02 Mar 2019 08:31) (13 - 17)  SpO2: 94% (02 Mar 2019 08:31) (92% - 98%)  I&O's Summary    01 Mar 2019 07:01  -  02 Mar 2019 07:00  --------------------------------------------------------  IN: 1150 mL / OUT: 450 mL / NET: 700 mL        PHYSICAL EXAM    Right lower extremity splint/dressing C/D/I  Toes warm, pink, and mobile  Capillary refill less than 3 sec.  Sensation grossly intact to light touch distally    LABS                          14.0   14.33<H> )-----------( 306      ( 01 Mar 2019 09:03 )             40.5   01 Mar 2019 09:03    01 Mar 2019 09:03    141    |  104    |  23     ----------------------------<  129<H>  4.1     |  26     |  0.78     Ca    9.4        01 Mar 2019 09:03        ASSESSMENT AND PLAN  - Orthopedically stable  - Pain management as needed  - DVT prophylaxis: Lovenox  - Ice/ Elevation  - PT/OT: Non weight bearing on operative leg  - Encourage incentive spirometry  - Disposition: Home today

## 2019-03-02 NOTE — DISCHARGE NOTE ADULT - PLAN OF CARE
Restore function Keep dressing/splint clean and dry.  Keep right lower extremity elevated  Mobilize toes every hour while awake  Do NOT bear weight on right lower extremity  Apply ice packs as needed: 20 minutes on, 20 minutes off     Call your doctor if you experience:  • An increase in pain not controlled by pain medication or change in activity or  position.  • Temperature greater than 101° F.  • Redness, increased swelling or foul smelling drainage from or around the  incision.  • Numbness, tingling or a change in color or temperature of the operative leg.  • Call your doctor immediately if you experience chest pain, shortness of breath or calf pain.    For Constipation :   • Increase your water intake. Drink at least 8 glasses of water daily.  • Try adding fiber to your diet by eating fruits, vegetables and foods that are rich in grains.  • If you do experience constipation, you may take an over-the-counter stool softener/laxative such as Colace, Senokot or Milk of Magnesia.

## 2019-03-02 NOTE — DISCHARGE NOTE ADULT - PATIENT PORTAL LINK FT
You can access the [facility] Patient Portal, offered by [facility], by registering with the following website: [email or web address]

## 2019-03-02 NOTE — DISCHARGE NOTE ADULT - MEDICATION SUMMARY - MEDICATIONS TO TAKE
I will START or STAY ON the medications listed below when I get home from the hospital:    Rolling walker with 5 inch wheels  -- Dx: s/p ORIF right ankle  -- Indication: For Ambulation assistance    oxyCODONE 5 mg oral tablet  -- 1 tab by mouth every 4 hours, As needed, Mod Pain; 2 tabs by mouth every 4 hous as needed for severe pain MDD:6  -- Indication: For pain    celecoxib 200 mg oral capsule  -- 1 cap(s) by mouth every 12 hours  -- Indication: For pain    Aspirin Enteric Coated 81 mg oral delayed release tablet  -- 1 tab(s) by mouth every 12 hours   -- Swallow whole.  Do not crush.  Take with food or milk.    -- Indication: For prevention of blood clots    lisinopril-hydrochlorothiazide 10 mg-12.5 mg oral tablet  -- 1 tab(s) by mouth once a day  -- Indication: For high blood pressure    Xanax 0.25 mg oral tablet  -- 1 tab(s) by mouth 3 times a day, As Needed  -- Indication: For Anxiety    docusate sodium 100 mg oral capsule  -- 1 cap(s) by mouth 3 times a day  -- Indication: For Stool softener    pantoprazole 40 mg oral delayed release tablet  -- 1 tab(s) by mouth once a day   -- It is very important that you take or use this exactly as directed.  Do not skip doses or discontinue unless directed by your doctor.  Obtain medical advice before taking any non-prescription drugs as some may affect the action of this medication.  Swallow whole.  Do not crush.    -- Indication: For prevention of ulcers while on aspirin

## 2019-03-02 NOTE — OCCUPATIONAL THERAPY INITIAL EVALUATION ADULT - NS ASR FOLLOW COMMAND OT EVAL
100% of the time/Patient educated regarding fall prevention and home/hospital safety. Pt educated on use of AE/DME recommended for safety & the need to call for assistance. Patient with good understanding. Role of OT and patient goals discussed.

## 2019-03-02 NOTE — PROGRESS NOTE ADULT - ASSESSMENT
This is a 63 y.o. female who slipped yesterday on ice, while getting into her car, twisting her right ankle.  She went to Dr. Gutiérrez's office afterwards, where x-rays revealed a bimalleolar ankle fx.  She was seen by both Dr. Gutiérrez and Dr. Wiley at the office and was scheduled for ORIF of the right ankle today. Virgilio any LOC, chest pain, palpitation, headache, dizziness. Patient states she did not hit his head.  Patient can walk a long distance without sob.    Closed fracture of right ankle initial encounter, s/p fall    pain meds. DVT prophylaxis as per protocol.  for ORIF of ankle today.    HTN  lisinopril with parameter.    Anxiety  xanax PRN.    Plan of care was discuss with patient, all questions were answered, seems understand and in agreement.

## 2019-03-02 NOTE — DISCHARGE NOTE ADULT - HOME CARE AGENCY
[facility] - [phone]  Nurse to visit the day after hospital discharge; physical therapist to follow. Please contact the home care agency at the above phone number if you have not heard from them by 12 noon on the day after your hospital discharge.

## 2019-03-02 NOTE — DISCHARGE NOTE ADULT - CARE PLAN
Principal Discharge DX:	Closed fracture of right ankle, initial encounter  Goal:	Restore function  Assessment and plan of treatment:	Keep dressing/splint clean and dry.  Keep right lower extremity elevated  Mobilize toes every hour while awake  Do NOT bear weight on right lower extremity  Apply ice packs as needed: 20 minutes on, 20 minutes off     Call your doctor if you experience:  • An increase in pain not controlled by pain medication or change in activity or  position.  • Temperature greater than 101° F.  • Redness, increased swelling or foul smelling drainage from or around the  incision.  • Numbness, tingling or a change in color or temperature of the operative leg.  • Call your doctor immediately if you experience chest pain, shortness of breath or calf pain.    For Constipation :   • Increase your water intake. Drink at least 8 glasses of water daily.  • Try adding fiber to your diet by eating fruits, vegetables and foods that are rich in grains.  • If you do experience constipation, you may take an over-the-counter stool softener/laxative such as Colace, Senokot or Milk of Magnesia.

## 2019-03-02 NOTE — DISCHARGE NOTE ADULT - HOSPITAL COURSE
JUSTIN ESTRADA    Admitted on 03-01-19     63y y.o.  Female with history of Ankle fracture, bimalleolar, closed, right, initial encounter    Surgery:   ORIF right Ankle fracture   Orthopedic Surgeon:   Dr. NAI Wiley    Eleanor-operative antibiotic:    ceFAZolin   IVPB:,       Consulted Services : Hospitalist, Physical Therapy, Occupational Therapy    Typical Physical & occupational therapy modalities post Ankle fracture surgery   were performed including ambulation training, range of motion, ADL's, and transfers.     DVT prophylaxis:  enoxaparin Injectable: 40 milliGRAM(s)       The patient had a clean appearing surgical incision with no sign of surgical site infections and had a stable neuro / vascular exam of the operated extremity.  After progression of mobility guided by the PT/ OT staff,  the patient was felt to benefit from further rehabilitative care for restoration to level of function. This was felt to best be accomplished  at Home.    Discharge and Orthopedic Care instructions were delineated in the Discharge Plan and reviewed with the patient. All medications were delineated in the medication reconciliation tool and key points were reviewed with the patient.     This patient was deemed stable from an Orthopedic & medical standpoint for discharge today.  She will follow up with Dr. NAI Wiley for further Orthopedic care.     Patient Discharged with Following prescriptions:    Rolling walker with 5 inch wheels: Dx: s/p ORIF right ankle

## 2019-03-02 NOTE — PHYSICAL THERAPY INITIAL EVALUATION ADULT - RANGE OF MOTION EXAMINATION, REHAB EVAL
R knee/ankle: not tested/Left LE ROM was WFL (within functional limits)/bilateral upper extremity ROM was WFL (within functional limits)

## 2019-03-02 NOTE — DISCHARGE NOTE ADULT - MEDICATION SUMMARY - MEDICATIONS TO STOP TAKING
I will STOP taking the medications listed below when I get home from the hospital:    diclofenac  -- orally once a day

## 2019-03-02 NOTE — PHYSICAL THERAPY INITIAL EVALUATION ADULT - PERTINENT HX OF CURRENT PROBLEM, REHAB EVAL
64 yo f who slipped 2/28/19 on ice, while getting into her car, twisting her right ankle. x-rays revealed a bimalleolar ankle fx. Pt scheduled for ORIF of the right ankle.

## 2019-03-03 VITALS
RESPIRATION RATE: 16 BRPM | DIASTOLIC BLOOD PRESSURE: 78 MMHG | HEART RATE: 68 BPM | TEMPERATURE: 98 F | OXYGEN SATURATION: 94 % | SYSTOLIC BLOOD PRESSURE: 145 MMHG

## 2019-03-03 LAB
HCT VFR BLD CALC: 37.2 % — SIGNIFICANT CHANGE UP (ref 34.5–45)
HGB BLD-MCNC: 12.5 G/DL — SIGNIFICANT CHANGE UP (ref 11.5–15.5)
MCHC RBC-ENTMCNC: 33.2 PG — SIGNIFICANT CHANGE UP (ref 27–34)
MCHC RBC-ENTMCNC: 33.6 GM/DL — SIGNIFICANT CHANGE UP (ref 32–36)
MCV RBC AUTO: 98.7 FL — SIGNIFICANT CHANGE UP (ref 80–100)
NRBC # BLD: 0 /100 WBCS — SIGNIFICANT CHANGE UP (ref 0–0)
PLATELET # BLD AUTO: 260 K/UL — SIGNIFICANT CHANGE UP (ref 150–400)
RBC # BLD: 3.77 M/UL — LOW (ref 3.8–5.2)
RBC # FLD: 12.2 % — SIGNIFICANT CHANGE UP (ref 10.3–14.5)
WBC # BLD: 11.7 K/UL — HIGH (ref 3.8–10.5)
WBC # FLD AUTO: 11.7 K/UL — HIGH (ref 3.8–10.5)

## 2019-03-03 PROCEDURE — 97165 OT EVAL LOW COMPLEX 30 MIN: CPT

## 2019-03-03 PROCEDURE — 97110 THERAPEUTIC EXERCISES: CPT

## 2019-03-03 PROCEDURE — C1713: CPT

## 2019-03-03 PROCEDURE — 76000 FLUOROSCOPY <1 HR PHYS/QHP: CPT

## 2019-03-03 PROCEDURE — 97535 SELF CARE MNGMENT TRAINING: CPT

## 2019-03-03 PROCEDURE — 12345: CPT | Mod: NC

## 2019-03-03 PROCEDURE — 97116 GAIT TRAINING THERAPY: CPT

## 2019-03-03 PROCEDURE — 99285 EMERGENCY DEPT VISIT HI MDM: CPT | Mod: 25

## 2019-03-03 PROCEDURE — 99233 SBSQ HOSP IP/OBS HIGH 50: CPT

## 2019-03-03 PROCEDURE — 86850 RBC ANTIBODY SCREEN: CPT

## 2019-03-03 PROCEDURE — 71045 X-RAY EXAM CHEST 1 VIEW: CPT

## 2019-03-03 PROCEDURE — 85027 COMPLETE CBC AUTOMATED: CPT

## 2019-03-03 PROCEDURE — 86901 BLOOD TYPING SEROLOGIC RH(D): CPT

## 2019-03-03 PROCEDURE — 97530 THERAPEUTIC ACTIVITIES: CPT

## 2019-03-03 PROCEDURE — 86803 HEPATITIS C AB TEST: CPT

## 2019-03-03 PROCEDURE — 86900 BLOOD TYPING SEROLOGIC ABO: CPT

## 2019-03-03 PROCEDURE — 93005 ELECTROCARDIOGRAM TRACING: CPT

## 2019-03-03 PROCEDURE — C1889: CPT

## 2019-03-03 PROCEDURE — 80048 BASIC METABOLIC PNL TOTAL CA: CPT

## 2019-03-03 PROCEDURE — 36415 COLL VENOUS BLD VENIPUNCTURE: CPT

## 2019-03-03 PROCEDURE — 85730 THROMBOPLASTIN TIME PARTIAL: CPT

## 2019-03-03 PROCEDURE — 85610 PROTHROMBIN TIME: CPT

## 2019-03-03 RX ORDER — MAGNESIUM HYDROXIDE 400 MG/1
30 TABLET, CHEWABLE ORAL ONCE
Qty: 0 | Refills: 0 | Status: COMPLETED | OUTPATIENT
Start: 2019-03-03 | End: 2019-03-03

## 2019-03-03 RX ORDER — MELOXICAM 15 MG/1
1 TABLET ORAL
Qty: 30 | Refills: 0 | OUTPATIENT
Start: 2019-03-03 | End: 2019-04-01

## 2019-03-03 RX ORDER — CELECOXIB 200 MG/1
1 CAPSULE ORAL
Qty: 24 | Refills: 0 | OUTPATIENT
Start: 2019-03-03 | End: 2019-03-14

## 2019-03-03 RX ORDER — ASPIRIN/CALCIUM CARB/MAGNESIUM 324 MG
1 TABLET ORAL
Qty: 84 | Refills: 0 | OUTPATIENT
Start: 2019-03-03 | End: 2019-04-13

## 2019-03-03 RX ORDER — OXYCODONE HYDROCHLORIDE 5 MG/1
1 TABLET ORAL
Qty: 30 | Refills: 0 | OUTPATIENT
Start: 2019-03-03 | End: 2019-03-09

## 2019-03-03 RX ORDER — DICLOFENAC SODIUM 75 MG/1
0 TABLET, DELAYED RELEASE ORAL
Qty: 0 | Refills: 0 | COMMUNITY

## 2019-03-03 RX ORDER — PANTOPRAZOLE SODIUM 20 MG/1
1 TABLET, DELAYED RELEASE ORAL
Qty: 30 | Refills: 1 | OUTPATIENT
Start: 2019-03-03 | End: 2019-05-01

## 2019-03-03 RX ORDER — DOCUSATE SODIUM 100 MG
1 CAPSULE ORAL
Qty: 0 | Refills: 0 | COMMUNITY
Start: 2019-03-03

## 2019-03-03 RX ADMIN — CELECOXIB 200 MILLIGRAM(S): 200 CAPSULE ORAL at 09:35

## 2019-03-03 RX ADMIN — ENOXAPARIN SODIUM 40 MILLIGRAM(S): 100 INJECTION SUBCUTANEOUS at 09:36

## 2019-03-03 RX ADMIN — OXYCODONE HYDROCHLORIDE 5 MILLIGRAM(S): 5 TABLET ORAL at 13:12

## 2019-03-03 RX ADMIN — OXYCODONE HYDROCHLORIDE 5 MILLIGRAM(S): 5 TABLET ORAL at 05:37

## 2019-03-03 RX ADMIN — Medication 100 MILLIGRAM(S): at 13:12

## 2019-03-03 RX ADMIN — OXYCODONE HYDROCHLORIDE 10 MILLIGRAM(S): 5 TABLET ORAL at 00:10

## 2019-03-03 RX ADMIN — MAGNESIUM HYDROXIDE 30 MILLILITER(S): 400 TABLET, CHEWABLE ORAL at 06:14

## 2019-03-03 RX ADMIN — LISINOPRIL 10 MILLIGRAM(S): 2.5 TABLET ORAL at 05:37

## 2019-03-03 RX ADMIN — CELECOXIB 200 MILLIGRAM(S): 200 CAPSULE ORAL at 09:37

## 2019-03-03 RX ADMIN — Medication 100 MILLIGRAM(S): at 05:37

## 2019-03-03 RX ADMIN — OXYCODONE HYDROCHLORIDE 5 MILLIGRAM(S): 5 TABLET ORAL at 06:30

## 2019-03-03 NOTE — PROGRESS NOTE ADULT - ASSESSMENT
This is a 63 y.o. female who slipped yesterday on ice, while getting into her car, twisting her right ankle.  She went to Dr. Gutiérrez's office afterwards, where x-rays revealed a bimalleolar ankle fx.  She was seen by both Dr. Gutiérrez and Dr. Wiley at the office and was scheduled for ORIF of the right ankle today. Virgilio any LOC, chest pain, palpitation, headache, dizziness. Patient states she did not hit his head.  Patient can walk a long distance without sob.    Closed fracture of right ankle initial encounter, s/p fall    pain meds. DVT prophylaxis as per protocol.  for ORIF of ankle today.    HTN  lisinopril with parameter.    Anxiety  xanax PRN.    Leucocytosis   Asymptomatic, likely reactive.   Check cbc today.      Plan of care was discuss with patient, all questions were answered, seems understand and in agreement.    Patient is medically stable for discharge pending today'CBC result and ortho, PT clearance.

## 2019-03-03 NOTE — PROGRESS NOTE ADULT - SUBJECTIVE AND OBJECTIVE BOX
POST OPERATIVE DAY #:  2  STATUS POST:  ORIF Right ankle                    SUBJECTIVE: Patient seen and examined.  Feeling well, although admits to a little dizziness from the pain medication.     Pain (0-10): 3      OBJECTIVE:     Vital Signs Last 24 Hrs  T(C): 36.9 (03 Mar 2019 11:20), Max: 36.9 (02 Mar 2019 12:36)  T(F): 98.4 (03 Mar 2019 11:20), Max: 98.5 (02 Mar 2019 15:02)  HR: 68 (03 Mar 2019 11:20) (61 - 72)  BP: 145/78 (03 Mar 2019 11:20) (123/65 - 145/78)  RR: 16 (03 Mar 2019 11:20) (14 - 18)  SpO2: 94% (03 Mar 2019 11:20) (93% - 98%)    Right lower extremity:          AO splint:  clean/dry/intact           Sensation:  intact to light touch           Motor exam:  able to move all toes on command          warm well perfused; capillary refill <3 seconds     LABS:                        12.5   11.70 )-----------( 260      ( 03 Mar 2019 11:54 )             37.2       Anticoagulation:  enoxaparin Injectable 40 milliGRAM(s) SubCutaneous every 24 hours      Pain medications:   ALPRAZolam 0.25 milliGRAM(s) Oral three times a day PRN  celecoxib 200 milliGRAM(s) Oral every 12 hours  HYDROmorphone  Injectable 0.5 milliGRAM(s) IV Push every 6 hours PRN  oxyCODONE    IR 10 milliGRAM(s) Oral every 4 hours PRN  oxyCODONE    IR 5 milliGRAM(s) Oral every 4 hours PRN        A/P : Patient stable s/p Right ankle ORIF  POD # 2  -    Pain control  -    DVT ppx: change to aspirin upon discharge  -    Weight bearing status: NWB Right LE  -    Physical Therapy  -    Occupational Therapy  -    Dispo: home today

## 2019-03-03 NOTE — PROGRESS NOTE ADULT - SUBJECTIVE AND OBJECTIVE BOX
Patient is a 63y old  Female who presents with a chief complaint of Right ankle fx (02 Mar 2019 10:36)      INTERVAL HPI/OVERNIGHT EVENTS:  Pt is seen and examined.  Pain is controlled.  Virgilio cough, cold, urinary problems, nausea, vomiting, abdominal pain.  Pain Location & Control:     MEDICATIONS  (STANDING):  celecoxib 200 milliGRAM(s) Oral every 12 hours  chlorhexidine 2% Cloths 1 Application(s) Topical daily  dextrose 5% + sodium chloride 0.45%. 1000 milliLiter(s) (120 mL/Hr) IV Continuous <Continuous>  docusate sodium 100 milliGRAM(s) Oral three times a day  enoxaparin Injectable 40 milliGRAM(s) SubCutaneous every 24 hours  lisinopril 10 milliGRAM(s) Oral daily    MEDICATIONS  (PRN):  ALPRAZolam 0.25 milliGRAM(s) Oral three times a day PRN Anxiety  aluminum hydroxide/magnesium hydroxide/simethicone Suspension 30 milliLiter(s) Oral four times a day PRN Indigestion  HYDROmorphone  Injectable 0.5 milliGRAM(s) IV Push every 6 hours PRN Severe Pain (7 - 10)  ondansetron Injectable 4 milliGRAM(s) IV Push every 6 hours PRN Nausea and/or Vomiting  oxyCODONE    IR 10 milliGRAM(s) Oral every 4 hours PRN Moderate Pain (4 - 6)  oxyCODONE    IR 5 milliGRAM(s) Oral every 4 hours PRN Mild Pain (1 - 3)      Allergies    No Known Allergies    Intolerances      Vital Signs Last 24 Hrs  T(C): 36.8 (03 Mar 2019 07:52), Max: 36.9 (02 Mar 2019 12:36)  T(F): 98.3 (03 Mar 2019 07:52), Max: 98.5 (02 Mar 2019 15:02)  HR: 61 (03 Mar 2019 07:52) (61 - 72)  BP: 134/73 (03 Mar 2019 07:52) (123/65 - 137/55)  BP(mean): --  RR: 18 (03 Mar 2019 07:52) (14 - 18)  SpO2: 96% (03 Mar 2019 07:52) (93% - 96%)        RADIOLOGY & ADDITIONAL TESTS:    Imaging Personally Reviewed:  [ ] YES  [ ] NO    Consultant(s) Notes Reviewed:  [ ] YES  [ ] NO    Care Discussed with Consultants/Other Providers [ x] YES  [ ] NO

## 2019-03-04 PROBLEM — F41.9 ANXIETY DISORDER, UNSPECIFIED: Chronic | Status: ACTIVE | Noted: 2019-03-01

## 2019-03-04 PROBLEM — I10 ESSENTIAL (PRIMARY) HYPERTENSION: Chronic | Status: ACTIVE | Noted: 2019-03-01

## 2019-03-07 ENCOUNTER — APPOINTMENT (OUTPATIENT)
Dept: ORTHOPEDIC SURGERY | Facility: CLINIC | Age: 64
End: 2019-03-07
Payer: COMMERCIAL

## 2019-03-07 PROCEDURE — 73610 X-RAY EXAM OF ANKLE: CPT | Mod: RT

## 2019-03-07 PROCEDURE — 29425 APPL SHORT LEG CAST WALKING: CPT | Mod: 58,RT

## 2019-03-07 PROCEDURE — 99024 POSTOP FOLLOW-UP VISIT: CPT

## 2019-03-07 NOTE — END OF VISIT
[FreeTextEntry3] : I, Fox Wiley MD, ordering physician, have read and attest that all the information, medical decision making and discharge instructions within are true and accurate.

## 2019-03-07 NOTE — ADDENDUM
[FreeTextEntry1] : I, Mary Lou Murray wrote this note acting as a scribe for Dr. Fox Wiley on Mar 07, 2019.

## 2019-03-07 NOTE — HISTORY OF PRESENT ILLNESS
[de-identified] : s/p ORIF right ankle on 03/01/2019 [de-identified] : The patient returns for the 1st postoperative visit after undergoing ORIF right ankle at [facility]. The surgery was on 03/01/2019. Patient presented to the ER c/o right ankle pain after injuring it. Xrays revealed a displaced lateral malleolus fracture. She reports mild postoperative pain. The patient is recovering at home. Patient is NWB.  [de-identified] : Patient is WDWN, alert, and in no acute distress. Breathing is unlabored. She is grossly oriented to person, place, and time. \par \par Postop splint was removed. Incision is healed. There are no signs of infection. Dissolvable sutures are in place. Edema and tenderness are present.  [de-identified] : 3v of the right ankle were obtained today and revealed a well-aligned bimalleolar fracture a plate and screws. Hardware is well-positioned.  [de-identified] : A right short leg cast was applied. Cast care instructions were reviewed. Gentle range of motion exercises were encouraged, including leg lifts. NSAIDs as tolerated. Follow up in 6 weeks for repeat xrays and cast removal.

## 2019-03-08 ENCOUNTER — RX RENEWAL (OUTPATIENT)
Age: 64
End: 2019-03-08

## 2019-03-08 RX ORDER — TRAMADOL HYDROCHLORIDE 50 MG/1
50 TABLET, COATED ORAL
Qty: 10 | Refills: 0 | Status: ACTIVE | COMMUNITY
Start: 2019-03-08 | End: 1900-01-01

## 2019-03-28 ENCOUNTER — APPOINTMENT (OUTPATIENT)
Dept: ORTHOPEDIC SURGERY | Facility: CLINIC | Age: 64
End: 2019-03-28

## 2019-04-11 ENCOUNTER — APPOINTMENT (OUTPATIENT)
Dept: ORTHOPEDIC SURGERY | Facility: CLINIC | Age: 64
End: 2019-04-11
Payer: COMMERCIAL

## 2019-04-11 ENCOUNTER — RX RENEWAL (OUTPATIENT)
Age: 64
End: 2019-04-11

## 2019-04-11 DIAGNOSIS — Z87.81 PRESENCE OF OTHER BONE AND TENDON IMPLANTS: ICD-10-CM

## 2019-04-11 DIAGNOSIS — Z96.7 PRESENCE OF OTHER BONE AND TENDON IMPLANTS: ICD-10-CM

## 2019-04-11 PROCEDURE — 99024 POSTOP FOLLOW-UP VISIT: CPT

## 2019-04-11 PROCEDURE — 73610 X-RAY EXAM OF ANKLE: CPT | Mod: RT

## 2019-04-11 NOTE — ADDENDUM
[FreeTextEntry1] : I, Mary Lou Murray wrote this note acting as a scribe for Dr. Fox Wiley on Apr 11, 2019.

## 2019-04-11 NOTE — HISTORY OF PRESENT ILLNESS
[de-identified] : s/p ORIF right ankle on 03/01/2019 [de-identified] : The patient returns for the 2nd postoperative visit after undergoing ORIF right ankle at [facility]. The surgery was on 03/01/2019. Patient presented to the ER c/o right ankle pain after injuring it. Xrays revealed a displaced lateral malleolus fracture. She was treated with a right short leg cast in this office on 03/07/2019. The patient is recovering at home. Patient has been NWB in the cast with a walker. She presents today with her  for cast removal and repeat xrays.   [de-identified] : Patient is WDWN, alert, and in no acute distress. Breathing is unlabored. She is grossly oriented to person, place, and time. \par \par Short leg cast was removed. Incision is healed. There are no signs of infection. Dissolvable sutures with steri strips are in place. Edema and tenderness are present.  [de-identified] : 3v of the right ankle were obtained today and revealed a well-aligned bimalleolar fracture a plate and screws. Hardware is well-positioned.  [de-identified] : Patient was advised to transition to WBAT with the walker. The patient wishes to proceed with physical therapy. A script was given. Gentle range of motion exercises were encouraged, including leg lifts. NSAIDs as tolerated. Follow up in 6 weeks for repeat xrays.

## 2019-05-20 ENCOUNTER — APPOINTMENT (OUTPATIENT)
Dept: ORTHOPEDIC SURGERY | Facility: CLINIC | Age: 64
End: 2019-05-20
Payer: COMMERCIAL

## 2019-05-20 DIAGNOSIS — S82.841D DISPLACED BIMALLEOLAR FRACTURE OF RIGHT LOWER LEG, SUBSEQUENT ENCOUNTER FOR CLOSED FRACTURE WITH ROUTINE HEALING: ICD-10-CM

## 2019-05-20 PROCEDURE — 99024 POSTOP FOLLOW-UP VISIT: CPT

## 2019-05-20 PROCEDURE — 73610 X-RAY EXAM OF ANKLE: CPT | Mod: RT

## 2019-05-20 RX ORDER — TRIAMCINOLONE ACETONIDE 1 MG/G
0.1 CREAM TOPICAL
Qty: 15 | Refills: 0 | Status: ACTIVE | COMMUNITY
Start: 2019-02-18

## 2019-05-20 RX ORDER — DOXYCYCLINE HYCLATE 100 MG/1
100 TABLET ORAL
Qty: 14 | Refills: 0 | Status: ACTIVE | COMMUNITY
Start: 2019-02-13

## 2019-05-20 RX ORDER — DICLOFENAC SODIUM 75 MG/1
75 TABLET, DELAYED RELEASE ORAL
Qty: 60 | Refills: 2 | Status: ACTIVE | COMMUNITY
Start: 2019-05-20 | End: 1900-01-01

## 2019-05-20 RX ORDER — LISINOPRIL AND HYDROCHLOROTHIAZIDE TABLETS 10; 12.5 MG/1; MG/1
10-12.5 TABLET ORAL
Qty: 30 | Refills: 0 | Status: ACTIVE | COMMUNITY
Start: 2018-12-17

## 2019-05-20 RX ORDER — OXYCODONE 5 MG/1
5 TABLET ORAL
Qty: 30 | Refills: 0 | Status: ACTIVE | COMMUNITY
Start: 2019-03-03

## 2019-05-20 RX ORDER — PROMETHAZINE HYDROCHLORIDE AND CODEINE PHOSPHATE 6.25; 1 MG/5ML; MG/5ML
6.25-1 SOLUTION ORAL
Qty: 120 | Refills: 0 | Status: ACTIVE | COMMUNITY
Start: 2019-02-13

## 2019-05-20 RX ORDER — ASPIRIN ENTERIC COATED TABLETS 81 MG 81 MG/1
81 TABLET, DELAYED RELEASE ORAL
Qty: 24 | Refills: 0 | Status: ACTIVE | COMMUNITY
Start: 2019-03-03

## 2019-05-20 RX ORDER — ALPRAZOLAM 0.5 MG/1
0.5 TABLET ORAL
Qty: 60 | Refills: 0 | Status: ACTIVE | COMMUNITY
Start: 2018-12-03

## 2019-05-20 RX ORDER — PANTOPRAZOLE 40 MG/1
40 TABLET, DELAYED RELEASE ORAL
Qty: 30 | Refills: 0 | Status: ACTIVE | COMMUNITY
Start: 2019-03-03

## 2019-05-20 RX ORDER — ALPRAZOLAM 0.25 MG/1
0.25 TABLET ORAL
Qty: 90 | Refills: 0 | Status: ACTIVE | COMMUNITY
Start: 2018-11-28

## 2019-05-20 NOTE — ADDENDUM
[FreeTextEntry1] : I, Mary Lou Murray wrote this note acting as a scribe for Dr. Fox Wiley on May 20, 2019.

## 2019-05-20 NOTE — HISTORY OF PRESENT ILLNESS
[de-identified] : s/p ORIF right ankle on 03/01/2019 [de-identified] : The patient returns for the 3rd postoperative visit after undergoing ORIF right ankle at [facility]. The surgery was on 03/01/2019. She was treated with a right short leg cast in this office on 03/07/2019 which has since been removed. The patient is recovering at home. Patient has been WBAT with a cane. She states that she continues to have a lot of pain.  She attends PT 2 x week. She has pain over the medial, anterior and posterior aspects. The pain increased 1.5 week ago. She admits to a previous injury in the ankle and believes it may be attributable to this.  [de-identified] : Patient is WDWN, alert, and in no acute distress. Breathing is unlabored. She is grossly oriented to person, place, and time. \par \par Incision is healed. There are no signs of infection. Edema and tenderness are present.  [de-identified] : 3v of the right ankle were obtained today and revealed a well-aligned bimalleolar fracture a plate and screws. Hardware is well-positioned. There is additional finding of osteoarthritis in the talonavicular joint.  [de-identified] : Continue with PT. Patient was placed in an AFO ankle brace. Gentle range of motion exercises were encouraged, including leg lifts. NSAIDs as tolerated. Follow up in 4 weeks.

## 2020-01-13 ENCOUNTER — APPOINTMENT (OUTPATIENT)
Dept: ORTHOPEDIC SURGERY | Facility: CLINIC | Age: 65
End: 2020-01-13
Payer: COMMERCIAL

## 2020-01-13 VITALS — HEIGHT: 64 IN | BODY MASS INDEX: 32.44 KG/M2 | WEIGHT: 190 LBS

## 2020-01-13 PROCEDURE — 99214 OFFICE O/P EST MOD 30 MIN: CPT

## 2020-01-13 PROCEDURE — 73562 X-RAY EXAM OF KNEE 3: CPT | Mod: LT

## 2020-01-13 NOTE — DISCUSSION/SUMMARY
[de-identified] : I discussed the underlying pathophysiology of the patient's condition in great detail with the patient. I went over the patient's x-rays with them in great detail.   The use of ice and rest was reviewed with the patient. I recommend the patient attend PT to further increase their strength and mobility.  At-home strengthening exercises were discussed and demonstrated in great detail with the patient. Needs to avoid high-impact activities such as running and jumping. I recommend alternate activities such as yoga, Pilates, barre classes, toning classes, and riding a stationary bike on low tension.\par \par We are going to request authorization for the gel injections. She will continue taking Diclofenac. I advised her to plan on getting the knee done in the summer of 2020. I told her the injections will only be a temporary solution. \par \par \par Planned surgery and surgical risks/benefits of the procedure have been discussed in detail with the patient. A model of the implant and brand/type of implant being used was discussed & demonstrated to the patient. Patient was given options to go to a rehab center or to go home after surgery. Recent studies show that going home after surgery has a higher success rate. \par The natural history and treatment of degenerative arthritis was discussed with the patient at length today. The spectrum of treatment including nonoperative modalities to surgical intervention was elucidated. Noninvasive and nonoperative treatment modalities include weight reduction, activity modification with low impact exercise, as needed use of acetaminophen or anti-inflammatory medications if tolerated, glucosamine/chondroitin supplements, and physical therapy. Further treatments can include corticosteroid injection and the use of viscosupplementation with hyaluronic acid injections. Definitive surgical treatment can certainly include total joint arthroplasty also. The risks and benefits of each treatment options was discussed and all questions were answered.\par In view of lack of adequate pain relief with conservative (non-surgical) management protocol including physical therapy, home exercises, weight loss, activity modification, NSAIDS. I informed the patient the advantages and disadvantages of a staged Bilateral Total Knee Replacement.\par The risks, benefits, alternatives, implications, complications including but not limited to pain, stiffness, bleeding, limp, wound breakdown, infection, bone fracture, nerve and vascular compromise, implant wear and durability issues and rehabilitation were discussed and relevant questions were addressed. The possibility of recurrent pain, no improvement in pain and actual worsening of the pain were also mentioned in conversation with the patient. Medical complications related to the patient's general medical health including deep vein thrombosis, pulmonary embolus, heart attack, stroke, death and other complications from anesthesia were discussed as well. The patient wishes to proceed and will undergo preoperative medical evaluation and clearance, attend the preoperative educational class and will schedule surgery appropriately.\par Anticoagulation prophylaxis medication options to address risks of deep vein thrombosis and pulmonary embolism were discussed and weighed against the risks of bleeding and wound healing complications. The patient elected aspirin/coumadin prophylaxis with mechanical modalities.

## 2020-01-13 NOTE — HISTORY OF PRESENT ILLNESS
[de-identified] : 64 year old female states she has right greater than left chronic knee pain, spontaneous onset. She has the main pain in the right knee, which causes difficulty ambulating and also weightbearing.  The pain is negatively impacting the patient's quality of life, and limiting normal daily activities. When she takes Diclofenac she feels significant relief. She states that she'd like to continue with this medication. \par MRI review of her left shoulder taken on 2/21/2019 which revealed a 1) low-grade partial-thickness tear of the subscapularis tendon at its insertion on the lesser tuberosity, 2) severe tendinosis of the supraspinatus and infraspinatus tendons without tear, 3) increased thickness and edema about the inferior glenohumeral ligament, a finding associated with adhesive capsulitis, 4) mild osteoarthritis of the glenohumeral and acromioclavicular joints.

## 2020-01-13 NOTE — ADDENDUM
[FreeTextEntry1] : I, Derick Fuentes, acted solely as a scribe for Dr. Gutiérrez on 01/13/2020  .\par  \par All medical record entries made by the scribe were at my, Dr. Gutiérrez, direction and personally dictated by me on 01/13/2020. I have reviewed the chart and agree that the record accurately reflects my personal performance of the history, physical exam, assessment and plan. I have also personally directed, reviewed, and agreed with the chart.

## 2020-01-27 RX ORDER — HYALURONATE SODIUM, STABILIZED 88 MG/4 ML
88 SYRINGE (ML) INTRAARTICULAR
Qty: 2 | Refills: 0 | Status: ACTIVE | COMMUNITY
Start: 2020-01-27

## 2020-02-06 ENCOUNTER — APPOINTMENT (OUTPATIENT)
Dept: ORTHOPEDIC SURGERY | Facility: CLINIC | Age: 65
End: 2020-02-06
Payer: COMMERCIAL

## 2020-02-06 VITALS — HEIGHT: 64 IN | BODY MASS INDEX: 32.44 KG/M2 | WEIGHT: 190 LBS

## 2020-02-06 PROCEDURE — 20611 DRAIN/INJ JOINT/BURSA W/US: CPT | Mod: RT

## 2020-02-06 PROCEDURE — 99214 OFFICE O/P EST MOD 30 MIN: CPT | Mod: 25

## 2020-07-16 RX ORDER — HYALURONATE SODIUM, STABILIZED 88 MG/4 ML
88 SYRINGE (ML) INTRAARTICULAR
Qty: 2 | Refills: 0 | Status: ACTIVE | COMMUNITY
Start: 2020-07-16 | End: 1900-01-01

## 2020-07-27 ENCOUNTER — APPOINTMENT (OUTPATIENT)
Dept: ORTHOPEDIC SURGERY | Facility: CLINIC | Age: 65
End: 2020-07-27
Payer: COMMERCIAL

## 2020-07-27 DIAGNOSIS — M19.019 PRIMARY OSTEOARTHRITIS, UNSPECIFIED SHOULDER: ICD-10-CM

## 2020-07-27 PROCEDURE — 99214 OFFICE O/P EST MOD 30 MIN: CPT | Mod: 25

## 2020-07-27 PROCEDURE — 20610 DRAIN/INJ JOINT/BURSA W/O US: CPT | Mod: LT

## 2020-07-27 RX ORDER — CELECOXIB 200 MG/1
200 CAPSULE ORAL
Qty: 30 | Refills: 3 | Status: ACTIVE | COMMUNITY
Start: 2019-03-03 | End: 1900-01-01

## 2020-08-20 ENCOUNTER — APPOINTMENT (OUTPATIENT)
Dept: ORTHOPEDIC SURGERY | Facility: CLINIC | Age: 65
End: 2020-08-20
Payer: COMMERCIAL

## 2020-08-20 PROCEDURE — 20611 DRAIN/INJ JOINT/BURSA W/US: CPT | Mod: RT

## 2020-08-20 PROCEDURE — 99214 OFFICE O/P EST MOD 30 MIN: CPT | Mod: 25

## 2021-01-27 ENCOUNTER — APPOINTMENT (OUTPATIENT)
Dept: ORTHOPEDIC SURGERY | Facility: CLINIC | Age: 66
End: 2021-01-27
Payer: COMMERCIAL

## 2021-01-27 DIAGNOSIS — M48.061 SPINAL STENOSIS, LUMBAR REGION WITHOUT NEUROGENIC CLAUDICATION: ICD-10-CM

## 2021-01-27 DIAGNOSIS — M47.816 SPONDYLOSIS W/OUT MYELOPATHY OR RADICULOPATHY, LUMBAR REGION: ICD-10-CM

## 2021-01-27 PROCEDURE — 99214 OFFICE O/P EST MOD 30 MIN: CPT | Mod: 25

## 2021-01-27 PROCEDURE — 72100 X-RAY EXAM L-S SPINE 2/3 VWS: CPT

## 2021-01-27 PROCEDURE — 20610 DRAIN/INJ JOINT/BURSA W/O US: CPT | Mod: LT

## 2021-01-27 PROCEDURE — 73562 X-RAY EXAM OF KNEE 3: CPT | Mod: LT

## 2021-01-27 PROCEDURE — 72170 X-RAY EXAM OF PELVIS: CPT

## 2021-01-27 PROCEDURE — 99072 ADDL SUPL MATRL&STAF TM PHE: CPT

## 2021-01-27 RX ORDER — HYALURONATE SODIUM, STABILIZED 88 MG/4 ML
88 SYRINGE (ML) INTRAARTICULAR
Qty: 2 | Refills: 0 | Status: ACTIVE | COMMUNITY
Start: 2021-01-27

## 2021-02-22 ENCOUNTER — RX RENEWAL (OUTPATIENT)
Age: 66
End: 2021-02-22

## 2021-03-22 ENCOUNTER — APPOINTMENT (OUTPATIENT)
Dept: ORTHOPEDIC SURGERY | Facility: CLINIC | Age: 66
End: 2021-03-22
Payer: COMMERCIAL

## 2021-03-22 PROCEDURE — 20610 DRAIN/INJ JOINT/BURSA W/O US: CPT | Mod: 59,RT

## 2021-03-22 PROCEDURE — 99214 OFFICE O/P EST MOD 30 MIN: CPT | Mod: 25

## 2021-03-22 PROCEDURE — 20611 DRAIN/INJ JOINT/BURSA W/US: CPT | Mod: LT

## 2021-03-22 PROCEDURE — 99072 ADDL SUPL MATRL&STAF TM PHE: CPT

## 2021-03-22 NOTE — ADDENDUM
[FreeTextEntry1] : I, Octavio Ortiz, acted solely as a scribe for Dr. Aron Gutiérrez on this date 03/22/2021.\par All medical record entries made by the Scribe were at my, Dr. Aron Gutiérrez, direction and personally dictated by me on 03/22/2021. I have reviewed the chart and agree that the record accurately reflects my personal performance of the history, physical exam, assessment and plan. I have also personally directed, reviewed, and agreed with the chart.

## 2021-03-22 NOTE — DISCUSSION/SUMMARY
[de-identified] : I went over the pathophysiology of the patient's symptoms in great detail with the patient. The patient elected to receive a cortisone injection into her right knee, and a Monovisc injection into her left today, and tolerated them well. I instructed the patient on ROM exercises, and told them to take it easy. The use of ice and rest was reviewed with the patient. The patient may resume activities tomorrow. I reminded the patient that it takes 4 to 6 weeks after the Monovisc injection to feel symptom relief. She will return in 1-2 weeks for a right knee Monovisc injection, and to have her neck and shoulder evaluated. All of her questions were answered. She understands and consents to the plan.\par \par FU 1-2 weeks.\par after a left knee Monovisc injection today (03/22/2021).\par after a right knee cortisone injection today (03/22/2021).\par for a right knee Monovisc injection.\par \par The patient is an 65 year old female with bone on bone arthritis of their bilateral knees. Based upon the patient's continued symptoms and failure to respond to conservative treatment I have recommended a total knee replacement for this patient. A long discussion took place with the patient describing what a total joint replacement is and what the procedure would entail. A total knee model, similar to the implant that will be used during the operation was utilized to demonstrate and to discuss the various bearing surfaces of the implants. The hospitalization and post-operative care and rehabilitation were also discussed. The use of perioperative antibiotics and DVT prophylaxis were discussed. The risk, benefits and alternatives to a surgical intervention were discussed at length with the patient. The patient was also advised of risks related to the medical comorbidities and elevated body mass index (BMI). A lengthy discussion took place to review the most common complications including but not limited to: deep vein thrombosis, pulmonary embolus, heart attack, stroke, infection, wound breakdown, numbness, damage to nerves, tendon, muscles, arteries or other blood vessels, death and other possible complications from anesthesia. The patient was told that we will take steps to minimize these risks by using sterile technique, antibiotics and DVT prophylaxis when appropriate and follow the patient postoperatively in the office setting to monitor progress. The possibility of recurrent pain, no improvement in pain and actual worsening of pain were also discussed with the patient.\par \par The discharge plan of care focused on the patient going home following surgery. I encouraged the patient to make the necessary arrangements to have someone stay with them when they are discharged home. Following discharge, a home care nurse will visit the patient. They will open your home care case and request home physical therapy services. Home physical therapy will commence following discharge provided it is appropriate and covered by her health insurance benefit plan.\par \par The risks, benefits and alternative treatment options of total joint replacement were reviewed with the patient at great length. All questions were answered to the satisfaction of the patient. The patient participated in an interactive discussion of the total knee replacement implant planned for their surgery with questions answered. The patient agreed with the treatment plan, and has decided to move forward with the elective total knee replacement as planned.\par \par JUSTIN ESTRADA was seen face to face and needs a commode for bedside use as the patient has no ability to acces the bathroom in their home. The patient also requires a rolling walker as this is needed for activities of daily living within their home secondary to the diagnosis of osteoarthritis.

## 2021-03-22 NOTE — PHYSICAL EXAM
[de-identified] : Constitutional\par o Appearance : well-nourished, well developed, alert, in no acute distress \par Head and Face\par o Head :\par ¦ Inspection : atraumatic, normocephalic\par o Face :\par ¦ Inspection : no visible rash or discoloration\par Respiratory\par o Respiratory Effort: breathing unlabored \par Neurologic\par o Mental Status Examination :\par ¦ Orientation : alert and oriented X 3\par Psychiatric\par o Mood and Affect: mood normal, affect appropriate\par Cardiovascular\par o Observation/Palpation : - no swelling\par Lymphatic\par o Additional Nodes : No palpable lymph nodes present\par \par Lumbosacral Spine\par o Inspection : no visible rash or discoloration\par o Palpation : no paraspinal musculature tenderness\par o Range of Motion : arc of motion full in all planes, no crepitance or pain with ROM\par o Muscle Strength : paraspinal muscle strength and tone within normal limits\par o Muscle Tone : paraspinal muscle strength and tone within normal limits\par Tests: straight leg test negative and BRADY test negative bilaterally \par \par Right Lower Extremity\par o Buttock : no tenderness, swelling or deformities \par o Right Hip :\par ¦ Inspection/Palpation : no tenderness, swelling or deformities\par ¦ Range of Motion : full and painless in all planes, no crepitance\par ¦ Stability : joint stability intact\par ¦ Strength : extension, flexion, adduction, abduction, internal rotation and external rotation, 5/5 \par \par o Right Knee :\par ¦ Inspection/Palpation : severe medial compartment tenderness, no lateral compartment tenderness, no swelling, varus deformity\par ¦ Range of Motion : ° with pain on full flexion, no crepitance, decreased patellofemoral glide \par ¦ Stability : valgus / varus instability present on provocative testing\par ¦ Strength : flexion and extension 5/5\par ¦ Tests and Signs : negative Anterior Drawer, negative Lachman, negative Christophe\par \par Left Lower Extremity\par o Buttock : no tenderness, swelling or deformities \par o Left Hip :\par ¦ Inspection/Palpation : no tenderness, no swelling or deformities\par ¦ Range of Motion : full and painless in all planes, no crepitance\par ¦ Stability : joint stability intact\par ¦ Strength : extension, flexion, adduction, abduction, internal rotation and external rotation, 5/5\par \par o Left Knee :\par ¦ Inspection/Palpation : medial compartment tenderness, no lateral compartment tenderness, no swelling, slight valgus deformity\par ¦ Range of Motion : 0-110°, no crepitance\par ¦ Stability : mild valgus / varus instability present on provocative testing\par ¦ Strength : flexion and extension 5/5\par ¦ Tests and Signs : negative Anterior Drawer, negative Lachman, negative Christophe\par \par Gait and Station:\par Gait: Varus thrust bilaterally no significant extremity swelling or lymphedema, good proprioception and balance\par \par o Knee injection : Indication- knee osteoarthritis, Anatomic location- right intra-articular joint space, Spray - area was sterilized with Betadine and alcohol and anesthetized with Ethyl Chloride , needle used-20G, Medications given- 5cc's lidocaine, 0.5cc's kenalog, 0.5 cc's dexamethasone, and patient tolerated it well. \par \par o Knee injection : Indication- knee osteoarthritis, Anatomic location- left intra-articular joint space, Spray - area was sterilized with Betadine and alcohol and anesthetized with Ethyl Chloride, needle used-20G, Medications given- 4cc's Monovisc under Ultrasound guidance, and patient tolerated it well. Davida# [id number]   oExp: 2023-01-31

## 2021-03-22 NOTE — HISTORY OF PRESENT ILLNESS
[de-identified] : 65 year old female presents with bilateral knee pain, left side worse than right. She is known to be bone on bone in both knees and understands that she needs to consider knee replacement. Her knee pain causes difficulty ambulating and weightbearing. She is miserable, and the pain is negatively impacting the patient's quality of life and limiting normal daily activities. She takes Diclofenac and states that she cannot walk without it. She is also complaining of lower back pain. She notes severe pain along the back of her left thigh that radiates into her lateral knee and calf. She only has this when sitting or lying down flat on her back. She received a left knee cortisone injection on 1/27/2021. She presents for a right knee cortisone injection, as well as a left knee Monovisc injection today (03/22/2021). She is also complaining of neck and shoulder pain that radiates down her arm. Of note, She had the 2nd COVID-19 vaccine over 2 weeks ago.\par \par Radiology Results done on 1/27/2021:\par o Lumbosacral Spine : AP and lateral views were obtained and reveal diffuse degenerative disc disease worse at L2/L3 and L4/L5 with a grade 1 spondylolisthesis of L4 on L5 and diffuse facet arthropathy, foraminal stenosis at L5/S1.\par o Hip and Pelvis : AP pelvis was obtained and reveal mild degenerative arthritis of both hips, no lytic lesions. \par o Right Knee : Standing AP, lateral and tunnel views of the knee were obtained and revealed bone on bone in the medial compartment with severe patellofemoral arthritis.\par o Left Knee : Standing AP, lateral and tunnel views of the knee were obtained and revealed bone on bone in the medial compartment with severe patellofemoral arthritis.

## 2021-03-29 ENCOUNTER — APPOINTMENT (OUTPATIENT)
Dept: ORTHOPEDIC SURGERY | Facility: CLINIC | Age: 66
End: 2021-03-29
Payer: COMMERCIAL

## 2021-03-29 PROCEDURE — 20611 DRAIN/INJ JOINT/BURSA W/US: CPT | Mod: RT

## 2021-03-29 PROCEDURE — 99072 ADDL SUPL MATRL&STAF TM PHE: CPT

## 2021-03-29 NOTE — HISTORY OF PRESENT ILLNESS
[de-identified] : 65 year old female presents with bilateral knee pain, left worse than right. She is known to be bone on bone in both knees and understands that she needs to consider knee replacement. Her knee pain causes difficulty ambulating and weightbearing. She is miserable, and the pain is negatively impacting the patient's quality of life and limiting normal daily activities. She takes Diclofenac and states that she cannot walk without it. She is also severe pain along the back of her left thigh that radiates into her lateral knee and calf. She only has this when sitting or lying down flat on her back. She received a left knee cortisone injection on 1/27/2021, and a right knee cortisone injection on 3/22/2021. She received a left knee Monovisc injection on 3/22/2021 and notes that her knee is feeling better. She presents for a right knee Monovisc injection today (03/29/2021). Of note, She had the 2nd COVID-19 vaccine over 3 weeks ago.\par \par Radiology Results done on 1/27/2021:\par o Lumbosacral Spine : AP and lateral views were obtained and reveal diffuse degenerative disc disease worse at L2/L3 and L4/L5 with a grade 1 spondylolisthesis of L4 on L5 and diffuse facet arthropathy, foraminal stenosis at L5/S1.\par o Hip and Pelvis : AP pelvis was obtained and reveal mild degenerative arthritis of both hips, no lytic lesions. \par o Right Knee : Standing AP, lateral and tunnel views of the knee were obtained and revealed bone on bone in the medial compartment with severe patellofemoral arthritis.\par o Left Knee : Standing AP, lateral and tunnel views of the knee were obtained and revealed bone on bone in the medial compartment with severe patellofemoral arthritis.

## 2021-03-29 NOTE — PHYSICAL EXAM
[de-identified] : Constitutional\par o Appearance : well-nourished, well developed, alert, in no acute distress \par Head and Face\par o Head :\par ¦ Inspection : atraumatic, normocephalic\par o Face :\par ¦ Inspection : no visible rash or discoloration\par Respiratory\par o Respiratory Effort: breathing unlabored \par Neurologic\par o Mental Status Examination :\par ¦ Orientation : alert and oriented X 3\par Psychiatric\par o Mood and Affect: mood normal, affect appropriate\par Cardiovascular\par o Observation/Palpation : - no swelling\par Lymphatic\par o Additional Nodes : No palpable lymph nodes present\par \par Lumbosacral Spine\par o Inspection : no visible rash or discoloration\par o Palpation : no paraspinal musculature tenderness\par o Range of Motion : arc of motion full in all planes, no crepitance or pain with ROM\par o Muscle Strength : paraspinal muscle strength and tone within normal limits\par o Muscle Tone : paraspinal muscle strength and tone within normal limits\par Tests: straight leg test negative and BRADY test negative bilaterally \par \par Right Lower Extremity\par o Buttock : no tenderness, swelling or deformities \par o Right Hip :\par ¦ Inspection/Palpation : no tenderness, swelling or deformities\par ¦ Range of Motion : full and painless in all planes, no crepitance\par ¦ Stability : joint stability intact\par ¦ Strength : extension, flexion, adduction, abduction, internal rotation and external rotation, 5/5 \par \par o Right Knee :\par ¦ Inspection/Palpation : severe medial compartment tenderness, no lateral compartment tenderness, no swelling, varus deformity\par ¦ Range of Motion : ° with pain on full flexion, no crepitance, decreased patellofemoral glide \par ¦ Stability : valgus / varus instability present on provocative testing\par ¦ Strength : flexion and extension 5/5\par ¦ Tests and Signs : negative Anterior Drawer, negative Lachman, negative Christophe\par \par Left Lower Extremity\par o Buttock : no tenderness, swelling or deformities \par o Left Hip :\par ¦ Inspection/Palpation : no tenderness, no swelling or deformities\par ¦ Range of Motion : full and painless in all planes, no crepitance\par ¦ Stability : joint stability intact\par ¦ Strength : extension, flexion, adduction, abduction, internal rotation and external rotation, 5/5\par \par o Left Knee :\par ¦ Inspection/Palpation : medial compartment tenderness, no lateral compartment tenderness, no swelling, slight valgus deformity\par ¦ Range of Motion : 0-110°, no crepitance\par ¦ Stability : mild valgus / varus instability present on provocative testing\par ¦ Strength : flexion and extension 5/5\par ¦ Tests and Signs : negative Anterior Drawer, negative Lachman, negative Christophe\par \par Gait and Station:\par Gait: Varus thrust bilaterally, no significant extremity swelling or lymphedema, good proprioception and balance\par \par o Knee injection : Indication- knee osteoarthritis, Anatomic location- right intra-articular joint space, Spray - area was sterilized with Betadine and alcohol and anesthetized with Ethyl Chloride, needle used-20G, Medications given- 4cc's Monovisc under Ultrasound guidance. \par oLot# [id number]   oExp: 2023-01-31

## 2021-03-29 NOTE — DISCUSSION/SUMMARY
[de-identified] : I went over the pathophysiology of the patient's symptoms in great detail with the patient. The patient elected to receive a Monovisc injection into her right knee today, and tolerated it well. I instructed the patient on ROM exercises, and told them to take it easy. The use of ice and rest was reviewed with the patient. The patient may resume activities tomorrow. I reminded the patient that it takes 4 to 6 weeks after the injection to feel symptom relief. All of her questions were answered. She understands and consents to the plan. \par \par FU 6 weeks.\par after a right knee Monovisc injection today (03/29/2021).\par \par The patient is an 65 year old female with bone on bone arthritis of their bilateral knees. Based upon the patient's continued symptoms and failure to respond to conservative treatment I have recommended a total knee replacement for this patient. A long discussion took place with the patient describing what a total joint replacement is and what the procedure would entail. A total knee model, similar to the implant that will be used during the operation was utilized to demonstrate and to discuss the various bearing surfaces of the implants. The hospitalization and post-operative care and rehabilitation were also discussed. The use of perioperative antibiotics and DVT prophylaxis were discussed. The risk, benefits and alternatives to a surgical intervention were discussed at length with the patient. The patient was also advised of risks related to the medical comorbidities and elevated body mass index (BMI). A lengthy discussion took place to review the most common complications including but not limited to: deep vein thrombosis, pulmonary embolus, heart attack, stroke, infection, wound breakdown, numbness, damage to nerves, tendon, muscles, arteries or other blood vessels, death and other possible complications from anesthesia. The patient was told that we will take steps to minimize these risks by using sterile technique, antibiotics and DVT prophylaxis when appropriate and follow the patient postoperatively in the office setting to monitor progress. The possibility of recurrent pain, no improvement in pain and actual worsening of pain were also discussed with the patient.\par \par The discharge plan of care focused on the patient going home following surgery. I encouraged the patient to make the necessary arrangements to have someone stay with them when they are discharged home. Following discharge, a home care nurse will visit the patient. They will open your home care case and request home physical therapy services. Home physical therapy will commence following discharge provided it is appropriate and covered by her health insurance benefit plan.\par \par The risks, benefits and alternative treatment options of total joint replacement were reviewed with the patient at great length. All questions were answered to the satisfaction of the patient. The patient participated in an interactive discussion of the total knee replacement implant planned for their surgery with questions answered. The patient agreed with the treatment plan, and has decided to move forward with the elective total knee replacement as planned.\par \par JUSTIN ESTRADA was seen face to face and needs a commode for bedside use as the patient has no ability to acces the bathroom in their home. The patient also requires a rolling walker as this is needed for activities of daily living within their home secondary to the diagnosis of osteoarthritis.

## 2021-03-29 NOTE — ADDENDUM
[FreeTextEntry1] : I, Octavio Ortiz, acted solely as a scribe for Dr. Aron Gutiérrez on this date 03/29/2021.\par All medical record entries made by the Scribe were at my, Dr. Aron Gutiérrez, direction and personally dictated by me on 03/29/2021. I have reviewed the chart and agree that the record accurately reflects my personal performance of the history, physical exam, assessment and plan. I have also personally directed, reviewed, and agreed with the chart.

## 2021-07-19 ENCOUNTER — APPOINTMENT (OUTPATIENT)
Dept: ORTHOPEDIC SURGERY | Facility: CLINIC | Age: 66
End: 2021-07-19
Payer: COMMERCIAL

## 2021-07-19 PROCEDURE — 20610 DRAIN/INJ JOINT/BURSA W/O US: CPT | Mod: RT

## 2021-07-19 PROCEDURE — 99214 OFFICE O/P EST MOD 30 MIN: CPT | Mod: 25

## 2021-07-19 PROCEDURE — 99072 ADDL SUPL MATRL&STAF TM PHE: CPT

## 2021-07-19 NOTE — HISTORY OF PRESENT ILLNESS
[de-identified] : 66 year old female presents with bilateral knee pain, left worse than right. She is known to be bone on bone in both knees and understands that she needs to consider knee replacement. Her knee pain causes difficulty ambulating and weightbearing. She is miserable, and the pain negatively impacts the patient's quality of life and limits normal daily activities. She states that even Diclofenac does not help anymore. She received a left knee cortisone on 1/27/21 and a Monovisc on 3/22/21. She received right knee cortisone on 1/27/21 and Monovisc on 3/29/21. She notes these injections helped until her pain flared 4 days ago. She denies injury. She is requesting a right knee cortisone injection today (07/19/2021). Pmhx: She is fully COVID-19 vaccinated.\par \par Radiology Results done on 1/27/2021:\par o Lumbosacral Spine : AP and lateral views were obtained and reveal diffuse degenerative disc disease worse at L2/L3 and L4/L5 with a grade 1 spondylolisthesis of L4 on L5 and diffuse facet arthropathy, foraminal stenosis at L5/S1.\par o Hip and Pelvis : AP pelvis was obtained and reveal mild degenerative arthritis of both hips, no lytic lesions. \par o Right Knee : Standing AP, lateral and tunnel views of the knee were obtained and revealed bone on bone in the medial compartment with severe patellofemoral arthritis.\par o Left Knee : Standing AP, lateral and tunnel views of the knee were obtained and revealed bone on bone in the medial compartment with severe patellofemoral arthritis.

## 2021-07-19 NOTE — ADDENDUM
[FreeTextEntry1] : I, Octavio Ortiz, acted solely as a scribe for Dr. Aron Gutiérrez on this date 07/19/2021.\par All medical record entries made by the Scribe were at my, Dr. Aron Gutiérrez, direction and personally dictated by me on 07/19/2021. I have reviewed the chart and agree that the record accurately reflects my personal performance of the history, physical exam, assessment and plan. I have also personally directed, reviewed, and agreed with the chart.

## 2021-07-19 NOTE — DISCUSSION/SUMMARY
[de-identified] : I went over the pathophysiology of the patient's symptoms in great detail with the patient. The patient elected to receive a cortisone injection into her right knee today, and tolerated it well. I instructed the patient on ROM exercises, and told them to take it easy. The use of ice and rest was reviewed with the patient. The patient may resume activities tomorrow. She understands that this injection delays a knee replacement by 3 months due to an increased risk of infection.\par I informed her that injections such as cortisone and viscosupplementation are short term solutions, and that surgery will be required to provide her with long term relief. Patient understands she needs to consider knee replacement given she is losing motion and conservative measures are not providing enough relief. We had a lengthy discussion about the patient's issues, and talked about the benefits and risks of the procedure. The proper pre and post operative procedures and expectations were discussed in extensive detail with the patient. She will consider a right TKR for October or November. All of her questions were answered. She understands and consents to the plan.\par \par FU 1 month.\par after a right knee cortisone injection today (07/19/2021).\par after considering a right TKR for October or November.\par \par The patient is an 66 year old female with bone on bone arthritis of their bilateral knees. Based upon the patient's continued symptoms and failure to respond to conservative treatment I have recommended a total knee replacement for this patient. A long discussion took place with the patient describing what a total joint replacement is and what the procedure would entail. A total knee model, similar to the implant that will be used during the operation was utilized to demonstrate and to discuss the various bearing surfaces of the implants. The hospitalization and post-operative care and rehabilitation were also discussed. The use of perioperative antibiotics and DVT prophylaxis were discussed. The risk, benefits and alternatives to a surgical intervention were discussed at length with the patient. The patient was also advised of risks related to the medical comorbidities and elevated body mass index (BMI). A lengthy discussion took place to review the most common complications including but not limited to: deep vein thrombosis, pulmonary embolus, heart attack, stroke, infection, wound breakdown, numbness, damage to nerves, tendon, muscles, arteries or other blood vessels, death and other possible complications from anesthesia. The patient was told that we will take steps to minimize these risks by using sterile technique, antibiotics and DVT prophylaxis when appropriate and follow the patient postoperatively in the office setting to monitor progress. The possibility of recurrent pain, no improvement in pain and actual worsening of pain were also discussed with the patient.\par \par The discharge plan of care focused on the patient going home following surgery. I encouraged the patient to make the necessary arrangements to have someone stay with them when they are discharged home. Following discharge, a home care nurse will visit the patient. They will open your home care case and request home physical therapy services. Home physical therapy will commence following discharge provided it is appropriate and covered by her health insurance benefit plan.\par \par The risks, benefits and alternative treatment options of total joint replacement were reviewed with the patient at great length. All questions were answered to the satisfaction of the patient. The patient participated in an interactive discussion of the total knee replacement implant planned for their surgery with questions answered. The patient agreed with the treatment plan, and has decided to move forward with the elective total knee replacement as planned.\par \par JUSTIN ESTRADA was seen face to face and needs a commode for bedside use as the patient has no ability to acces the bathroom in their home. The patient also requires a rolling walker as this is needed for activities of daily living within their home secondary to the diagnosis of osteoarthritis.

## 2021-07-19 NOTE — PHYSICAL EXAM
[de-identified] : Constitutional\par o Appearance : well-nourished, well developed, alert, in no acute distress \par Head and Face\par o Head :\par ¦ Inspection : atraumatic, normocephalic\par o Face :\par ¦ Inspection : no visible rash or discoloration\par Respiratory\par o Respiratory Effort: breathing unlabored \par Neurologic\par o Mental Status Examination :\par ¦ Orientation : alert and oriented X 3\par Psychiatric\par o Mood and Affect: mood normal, affect appropriate\par Cardiovascular\par o Observation/Palpation : - no swelling\par Lymphatic\par o Additional Nodes : No palpable lymph nodes present\par \par Lumbosacral Spine\par o Inspection : no visible rash or discoloration\par o Palpation : no paraspinal musculature tenderness\par o Range of Motion : arc of motion full in all planes, no crepitance or pain with ROM\par o Muscle Strength : paraspinal muscle strength and tone within normal limits\par o Muscle Tone : paraspinal muscle strength and tone within normal limits\par Tests: straight leg test negative and BRADY test negative bilaterally \par \par Right Lower Extremity\par o Buttock : no tenderness, swelling or deformities \par o Right Hip :\par ¦ Inspection/Palpation : no tenderness, swelling or deformities\par ¦ Range of Motion : full and painless in all planes, no crepitance\par ¦ Stability : joint stability intact\par ¦ Strength : extension, flexion, adduction, abduction, internal rotation and external rotation, 5/5 \par \par o Right Knee :\par ¦ Inspection/Palpation : severe medial compartment tenderness, no lateral compartment tenderness, no swelling, severe varus deformity\par ¦ Range of Motion : ° with pain on full flexion, no crepitance, decreased patellofemoral glide \par ¦ Stability : valgus / varus instability present on provocative testing\par ¦ Strength : flexion and extension 5/5\par ¦ Tests and Signs : negative Anterior Drawer, negative Lachman, negative Christophe\par \par Left Lower Extremity\par o Buttock : no tenderness, swelling or deformities \par o Left Hip :\par ¦ Inspection/Palpation : no tenderness, no swelling or deformities\par ¦ Range of Motion : full and painless in all planes, no crepitance\par ¦ Stability : joint stability intact\par ¦ Strength : extension, flexion, adduction, abduction, internal rotation and external rotation, 5/5\par \par o Left Knee :\par ¦ Inspection/Palpation : medial compartment tenderness, no lateral compartment tenderness, no swelling, slight valgus deformity\par ¦ Range of Motion : 0-110°, no crepitance\par ¦ Stability : mild valgus / varus instability present on provocative testing\par ¦ Strength : flexion and extension 5/5\par ¦ Tests and Signs : negative Anterior Drawer, negative Lachman, negative Christophe\par \par Gait and Station:\par Gait: Varus thrust bilaterally,antalgic,  no significant extremity swelling or lymphedema, good proprioception and balance\par \par o Right Knee injection : Indication- knee osteoarthritis, Anatomic location- right intra-articular joint space, Spray - area was sterilized with Betadine and alcohol and anesthetized with Ethyl Chloride , needle used-20G, Medications given- 5cc's lidocaine, 0.5cc's kenalog, 0.5 cc's dexamethasone, and patient tolerated it well.

## 2021-10-04 ENCOUNTER — APPOINTMENT (OUTPATIENT)
Dept: ULTRASOUND IMAGING | Facility: CLINIC | Age: 66
End: 2021-10-04
Payer: COMMERCIAL

## 2021-10-04 ENCOUNTER — APPOINTMENT (OUTPATIENT)
Dept: MAMMOGRAPHY | Facility: CLINIC | Age: 66
End: 2021-10-04
Payer: COMMERCIAL

## 2021-10-04 ENCOUNTER — APPOINTMENT (OUTPATIENT)
Dept: RADIOLOGY | Facility: CLINIC | Age: 66
End: 2021-10-04
Payer: COMMERCIAL

## 2021-10-04 PROCEDURE — 77080 DXA BONE DENSITY AXIAL: CPT

## 2021-10-04 PROCEDURE — 77067 SCR MAMMO BI INCL CAD: CPT

## 2021-10-04 PROCEDURE — 77063 BREAST TOMOSYNTHESIS BI: CPT

## 2021-10-04 PROCEDURE — 76641 ULTRASOUND BREAST COMPLETE: CPT | Mod: 50

## 2021-10-07 ENCOUNTER — APPOINTMENT (OUTPATIENT)
Dept: ORTHOPEDIC SURGERY | Facility: CLINIC | Age: 66
End: 2021-10-07
Payer: COMMERCIAL

## 2021-10-07 PROCEDURE — 73562 X-RAY EXAM OF KNEE 3: CPT | Mod: 50

## 2021-10-07 PROCEDURE — 99214 OFFICE O/P EST MOD 30 MIN: CPT | Mod: 25

## 2021-10-07 PROCEDURE — 20610 DRAIN/INJ JOINT/BURSA W/O US: CPT | Mod: RT

## 2021-10-07 RX ORDER — HYALURONATE SODIUM, STABILIZED 88 MG/4 ML
88 SYRINGE (ML) INTRAARTICULAR
Qty: 2 | Refills: 0 | Status: ACTIVE | COMMUNITY
Start: 2021-10-07

## 2021-10-07 NOTE — ADDENDUM
[FreeTextEntry1] : I, Octavio Ortiz, acted solely as a scribe for Dr. Aron Gutiérrez on this date 10/07/2021.\par All medical record entries made by the Scribe were at my, Dr. Aron Gutiérrez, direction and personally dictated by me on 10/07/2021. I have reviewed the chart and agree that the record accurately reflects my personal performance of the history, physical exam, assessment and plan. I have also personally directed, reviewed, and agreed with the chart.

## 2021-10-07 NOTE — DISCUSSION/SUMMARY
[de-identified] : I discussed the underlying pathophysiology of the patient's condition in great detail with the patient. I went over the patient's x-rays with them in great detail. She was told flat out that her knees are horrible and that she needs a right knee replacement. I informed her that the longer she puts off knee replacement the worse the outcome will be, especially with the flexion contracture that she already has. Patient understands she needs to consider knee replacement given she is losing motion and conservative measures are not providing enough relief. She says she would consider surgery for around [facility].\par The patient elected to receive a cortisone injection into her right knee today, and tolerated it well. I instructed the patient on ROM exercises, and told them to take it easy. The use of ice and rest was reviewed with the patient. The patient may resume activities tomorrow. Viscosupplementation was discussed as a solution to the patient's symptoms, and we are requesting authorization for Monovisc for both knees. All of her questions were answered. She understands and consents to the plan.\par \par FU 2-3 weeks.\par after a right knee cortisone injection today (10/07/2021).\par after viscosupplementation authorization. \par \par The patient is an 66 year old female with bone on bone arthritis of their bilateral knees. Based upon the patient's continued symptoms and failure to respond to conservative treatment I have recommended a total knee replacement for this patient. A long discussion took place with the patient describing what a total joint replacement is and what the procedure would entail. A total knee model, similar to the implant that will be used during the operation was utilized to demonstrate and to discuss the various bearing surfaces of the implants. The hospitalization and post-operative care and rehabilitation were also discussed. The use of perioperative antibiotics and DVT prophylaxis were discussed. The risk, benefits and alternatives to a surgical intervention were discussed at length with the patient. The patient was also advised of risks related to the medical comorbidities and elevated body mass index (BMI). A lengthy discussion took place to review the most common complications including but not limited to: deep vein thrombosis, pulmonary embolus, heart attack, stroke, infection, wound breakdown, numbness, damage to nerves, tendon, muscles, arteries or other blood vessels, death and other possible complications from anesthesia. The patient was told that we will take steps to minimize these risks by using sterile technique, antibiotics and DVT prophylaxis when appropriate and follow the patient postoperatively in the office setting to monitor progress. The possibility of recurrent pain, no improvement in pain and actual worsening of pain were also discussed with the patient.\par \par The discharge plan of care focused on the patient going home following surgery. I encouraged the patient to make the necessary arrangements to have someone stay with them when they are discharged home. Following discharge, a home care nurse will visit the patient. They will open your home care case and request home physical therapy services. Home physical therapy will commence following discharge provided it is appropriate and covered by her health insurance benefit plan.\par \par The risks, benefits and alternative treatment options of total joint replacement were reviewed with the patient at great length. All questions were answered to the satisfaction of the patient. The patient participated in an interactive discussion of the total knee replacement implant planned for their surgery with questions answered. The patient agreed with the treatment plan, and has decided to move forward with the elective total knee replacement as planned.\par \par JUSTIN ESTRADA was seen face to face and needs a commode for bedside use as the patient has no ability to acces the bathroom in their home. The patient also requires a rolling walker as this is needed for activities of daily living within their home secondary to the diagnosis of osteoarthritis.

## 2021-10-07 NOTE — HISTORY OF PRESENT ILLNESS
[de-identified] : 66 year old female presents with bilateral knee pain, right worse than left. She received bilateral knee cortisone injections in late January 2021 and Monovisc injections late March 2021. These injections helped until about mid-July. She received another right knee cortisone injection on 7/19/2021. She is miserable, and the pain negatively impacts the patient's quality of life and limits normal daily activities. She states that even Diclofenac does not help anymore. Her knee pain causes difficulty ambulating and weightbearing. She understands that she has severe arthritis of both knees and needs to consider knee replacement. She wants to proceed with another round of viscosupplementation. Pmhx: She is fully COVID-19 vaccinated.\par \par Radiology Results done on 1/27/2021:\par o Lumbosacral Spine : AP and lateral views were obtained and reveal diffuse degenerative disc disease worse at L2/L3 and L4/L5 with a grade 1 spondylolisthesis of L4 on L5 and diffuse facet arthropathy, foraminal stenosis at L5/S1.\par o Hip and Pelvis : AP pelvis was obtained and reveal mild degenerative arthritis of both hips, no lytic lesions.

## 2021-10-07 NOTE — PHYSICAL EXAM
[de-identified] : Constitutional\par o Appearance : well-nourished, well developed, alert, in no acute distress \par Head and Face\par o Head :\par ¦ Inspection : atraumatic, normocephalic\par o Face :\par ¦ Inspection : no visible rash or discoloration\par Respiratory\par o Respiratory Effort: breathing unlabored \par Neurologic\par o Mental Status Examination :\par ¦ Orientation : alert and oriented X 3\par Psychiatric\par o Mood and Affect: mood normal, affect appropriate\par Cardiovascular\par o Observation/Palpation : - no swelling\par Lymphatic\par o Additional Nodes : No palpable lymph nodes present\par \par Lumbosacral Spine\par o Inspection : no visible rash or discoloration\par o Palpation : no paraspinal musculature tenderness\par o Range of Motion : arc of motion full in all planes, no crepitance or pain with ROM\par o Muscle Strength : paraspinal muscle strength and tone within normal limits\par o Muscle Tone : paraspinal muscle strength and tone within normal limits\par Tests: straight leg test negative and BRADY test negative bilaterally \par \par Right Lower Extremity\par o Buttock : no tenderness, swelling or deformities \par o Right Hip :\par ¦ Inspection/Palpation : no tenderness, swelling or deformities\par ¦ Range of Motion : full and painless in all planes, no crepitance\par ¦ Stability : joint stability intact\par ¦ Strength : extension, flexion, adduction, abduction, internal rotation and external rotation, 5/5 \par \par o Right Knee :\par ¦ Inspection/Palpation : severe medial compartment tenderness, no lateral compartment tenderness, no swelling, severe varus deformity\par ¦ Range of Motion : ° with pain on full flexion, no crepitance, decreased patellofemoral glide \par ¦ Stability : valgus / varus instability present on provocative testing\par ¦ Strength : flexion and extension 5/5\par ¦ Tests and Signs : negative Anterior Drawer, negative Lachman, negative Christophe\par \par Left Lower Extremity\par o Buttock : no tenderness, swelling or deformities \par o Left Hip :\par ¦ Inspection/Palpation : no tenderness, no swelling or deformities\par ¦ Range of Motion : full and painless in all planes, no crepitance\par ¦ Stability : joint stability intact\par ¦ Strength : extension, flexion, adduction, abduction, internal rotation and external rotation, 5/5\par \par o Left Knee :\par ¦ Inspection/Palpation : medial compartment tenderness, no lateral compartment tenderness, no swelling, slight valgus deformity\par ¦ Range of Motion : 0-110°, no crepitance\par ¦ Stability : mild valgus / varus instability present on provocative testing\par ¦ Strength : flexion and extension 5/5\par ¦ Tests and Signs : negative Anterior Drawer, negative Lachman, negative Christophe\par \par Gait and Station:\par Gait: Varus thrust bilaterally, antalgic, no significant extremity swelling or lymphedema, good proprioception and balance\par \par Radiology Results:\par o Right Knee : Standing AP, lateral and tunnel views of the knee were obtained and reveal bone on bone in the medial compartment with severe patellofemoral arthritis.\par o Left Knee : Standing AP, lateral and tunnel views of the knee were obtained and reveal bone on bone in the medial compartment with severe patellofemoral arthritis.\par \par o Right Knee injection : Indication- knee osteoarthritis, Anatomic location- right intra-articular joint space, Spray - area was sterilized with Betadine and alcohol and anesthetized with Ethyl Chloride , needle used-20G, Medications given- 5cc's lidocaine, 0.5cc's kenalog, 0.5 cc's dexamethasone, and patient tolerated it well.

## 2021-10-25 ENCOUNTER — APPOINTMENT (OUTPATIENT)
Dept: ORTHOPEDIC SURGERY | Facility: CLINIC | Age: 66
End: 2021-10-25
Payer: COMMERCIAL

## 2021-10-25 VITALS — SYSTOLIC BLOOD PRESSURE: 153 MMHG | HEART RATE: 81 BPM | OXYGEN SATURATION: 96 % | DIASTOLIC BLOOD PRESSURE: 74 MMHG

## 2021-10-25 PROCEDURE — 20611 DRAIN/INJ JOINT/BURSA W/US: CPT | Mod: RT

## 2021-10-25 NOTE — PHYSICAL EXAM
[de-identified] : Constitutional\par o Appearance : well-nourished, well developed, alert, in no acute distress \par Head and Face\par o Head :\par ¦ Inspection : atraumatic, normocephalic\par o Face :\par ¦ Inspection : no visible rash or discoloration\par Respiratory\par o Respiratory Effort: breathing unlabored \par Neurologic\par o Mental Status Examination :\par ¦ Orientation : alert and oriented X 3\par Psychiatric\par o Mood and Affect: mood normal, affect appropriate\par Cardiovascular\par o Observation/Palpation : - no swelling\par Lymphatic\par o Additional Nodes : No palpable lymph nodes present\par \par Lumbosacral Spine\par o Inspection : no visible rash or discoloration\par o Palpation : no paraspinal musculature tenderness\par o Range of Motion : arc of motion full in all planes, no crepitance or pain with ROM\par o Muscle Strength : paraspinal muscle strength and tone within normal limits\par o Muscle Tone : paraspinal muscle strength and tone within normal limits\par Tests: straight leg test negative and BRADY test negative bilaterally \par \par Right Lower Extremity\par o Buttock : no tenderness, swelling or deformities \par o Right Hip :\par ¦ Inspection/Palpation : no tenderness, swelling or deformities\par ¦ Range of Motion : full and painless in all planes, no crepitance\par ¦ Stability : joint stability intact\par ¦ Strength : extension, flexion, adduction, abduction, internal rotation and external rotation, 5/5 \par \par o Right Knee :\par ¦ Inspection/Palpation : mild  medial compartment tenderness, no lateral compartment tenderness, no swelling, severe varus deformity\par ¦ Range of Motion : ° with pain on full flexion, no crepitance, decreased patellofemoral glide \par ¦ Stability : valgus / varus instability present on provocative testing\par ¦ Strength : flexion and extension 5/5\par ¦ Tests and Signs : negative Anterior Drawer, negative Lachman, negative Christophe\par \par Left Lower Extremity\par o Buttock : no tenderness, swelling or deformities \par o Left Hip :\par ¦ Inspection/Palpation : no tenderness, no swelling or deformities\par ¦ Range of Motion : full and painless in all planes, no crepitance\par ¦ Stability : joint stability intact\par ¦ Strength : extension, flexion, adduction, abduction, internal rotation and external rotation, 5/5\par \par o Left Knee :\par ¦ Inspection/Palpation : medial compartment tenderness, no lateral compartment tenderness, no swelling, slight valgus deformity\par ¦ Range of Motion : 0-110°, no crepitance\par ¦ Stability : mild valgus / varus instability present on provocative testing\par ¦ Strength : flexion and extension 5/5\par ¦ Tests and Signs : negative Anterior Drawer, negative Lachman, negative Christophe\par \par Gait and Station:\par Gait: Varus thrust bilaterally, antalgic, no significant extremity swelling or lymphedema, good proprioception and balance\par \par o Right Knee injection : Indication- knee osteoarthritis, Anatomic location- right intra-articular joint space, Spray - area was sterilized with Betadine and alcohol and anesthetized with Ethyl Chloride, needle used-20G, Medications given- 4cc's Monovisc under Ultrasound guidance, and patient tolerated it well. oLot# [id number]  oExp: 2023-03-31

## 2021-10-25 NOTE — ADDENDUM
[FreeTextEntry1] : I, Octavio Ortiz, acted solely as a scribe for Dr. Aron Gutiérrez on this date 10/25/2021.\par All medical record entries made by the Scribe were at my, Dr. Aron Gutiérrez, direction and personally dictated by me on 10/25/2021. I have reviewed the chart and agree that the record accurately reflects my personal performance of the history, physical exam, assessment and plan. I have also personally directed, reviewed, and agreed with the chart.

## 2021-10-25 NOTE — HISTORY OF PRESENT ILLNESS
[de-identified] : 66 year old female presents with bilateral knee pain, right worse than left. She is miserable, and the pain negatively impacts the patient's quality of life and limits normal daily activities. She states that even Diclofenac does not help anymore. Her knee pain causes difficulty ambulating and weightbearing. She understands that she has severe arthritis of both knees and needs to consider knee replacement. She wants to try another round of viscosupplementation. She received a right knee cortisone injection on 10/7/2021. She presents for a right knee Monovisc injection today (10/25/2021). Of note, she last had left knee cortisone in 01/2021. Pmhx: She is fully COVID-19 vaccinated.\par \par Radiology Results taken on 10/7/2021:\par o Right Knee : Standing AP, lateral and tunnel views of the knee were obtained and reveal bone on bone in the medial compartment with severe patellofemoral arthritis.\par o Left Knee : Standing AP, lateral and tunnel views of the knee were obtained and reveal bone on bone in the medial compartment with severe patellofemoral arthritis.\par \par Radiology Results done on 1/27/2021:\par o Lumbosacral Spine : AP and lateral views were obtained and reveal diffuse degenerative disc disease worse at L2/L3 and L4/L5 with a grade 1 spondylolisthesis of L4 on L5 and diffuse facet arthropathy, foraminal stenosis at L5/S1.\par o Hip and Pelvis : AP pelvis was obtained and reveal mild degenerative arthritis of both hips, no lytic lesions.

## 2021-10-25 NOTE — DISCUSSION/SUMMARY
[de-identified] : I went over the pathophysiology of the patient's symptoms in great detail with the patient. The patient elected to receive a Monovisc injection into her right knee today, and tolerated it well. I instructed the patient on ROM exercises, and told them to take it easy. The use of ice and rest was reviewed with the patient. The patient may resume activities tomorrow. I reminded the patient that it takes 4 to 6 weeks after the injection to feel symptom relief. All of her questions were answered. She understands and consents to the plan. \par \par FU 1 week.\par after a right knee Monovisc injection today (10/25/2021). \par for a left knee Monovisc injection. \par \par The patient is an 66 year old female with bone on bone arthritis of their bilateral knees. Based upon the patient's continued symptoms and failure to respond to conservative treatment I have recommended a total knee replacement for this patient. A long discussion took place with the patient describing what a total joint replacement is and what the procedure would entail. A total knee model, similar to the implant that will be used during the operation was utilized to demonstrate and to discuss the various bearing surfaces of the implants. The hospitalization and post-operative care and rehabilitation were also discussed. The use of perioperative antibiotics and DVT prophylaxis were discussed. The risk, benefits and alternatives to a surgical intervention were discussed at length with the patient. The patient was also advised of risks related to the medical comorbidities and elevated body mass index (BMI). A lengthy discussion took place to review the most common complications including but not limited to: deep vein thrombosis, pulmonary embolus, heart attack, stroke, infection, wound breakdown, numbness, damage to nerves, tendon, muscles, arteries or other blood vessels, death and other possible complications from anesthesia. The patient was told that we will take steps to minimize these risks by using sterile technique, antibiotics and DVT prophylaxis when appropriate and follow the patient postoperatively in the office setting to monitor progress. The possibility of recurrent pain, no improvement in pain and actual worsening of pain were also discussed with the patient.\par \par The discharge plan of care focused on the patient going home following surgery. I encouraged the patient to make the necessary arrangements to have someone stay with them when they are discharged home. Following discharge, a home care nurse will visit the patient. They will open your home care case and request home physical therapy services. Home physical therapy will commence following discharge provided it is appropriate and covered by her health insurance benefit plan.\par \par The risks, benefits and alternative treatment options of total joint replacement were reviewed with the patient at great length. All questions were answered to the satisfaction of the patient. The patient participated in an interactive discussion of the total knee replacement implant planned for their surgery with questions answered. The patient agreed with the treatment plan, and has decided to move forward with the elective total knee replacement as planned.\par \par JUSTIN ESTRADA was seen face to face and needs a commode for bedside use as the patient has no ability to acces the bathroom in their home. The patient also requires a rolling walker as this is needed for activities of daily living within their home secondary to the diagnosis of osteoarthritis.

## 2021-11-03 ENCOUNTER — APPOINTMENT (OUTPATIENT)
Dept: ORTHOPEDIC SURGERY | Facility: CLINIC | Age: 66
End: 2021-11-03
Payer: COMMERCIAL

## 2021-11-03 DIAGNOSIS — M17.0 BILATERAL PRIMARY OSTEOARTHRITIS OF KNEE: ICD-10-CM

## 2021-11-03 PROCEDURE — 20611 DRAIN/INJ JOINT/BURSA W/US: CPT | Mod: LT

## 2021-11-03 NOTE — ADDENDUM
[FreeTextEntry1] : I, Octavio Ortiz, acted solely as a scribe for Dr. Aron Gutiérrez on this date 11/03/2021.\par All medical record entries made by the Scribe were at my, Dr. Aron Gutiérrez, direction and personally dictated by me on 11/03/2021. I have reviewed the chart and agree that the record accurately reflects my personal performance of the history, physical exam, assessment and plan. I have also personally directed, reviewed, and agreed with the chart.

## 2021-11-03 NOTE — PHYSICAL EXAM
[de-identified] : Constitutional\par o Appearance : well-nourished, well developed, alert, in no acute distress \par Head and Face\par o Head :\par ¦ Inspection : atraumatic, normocephalic\par o Face :\par ¦ Inspection : no visible rash or discoloration\par Respiratory\par o Respiratory Effort: breathing unlabored \par Neurologic\par o Mental Status Examination :\par ¦ Orientation : alert and oriented X 3\par Psychiatric\par o Mood and Affect: mood normal, affect appropriate\par Cardiovascular\par o Observation/Palpation : - no swelling\par Lymphatic\par o Additional Nodes : No palpable lymph nodes present\par \par Lumbosacral Spine\par o Inspection : no visible rash or discoloration\par o Palpation : no paraspinal musculature tenderness\par o Range of Motion : arc of motion full in all planes, no crepitance or pain with ROM\par o Muscle Strength : paraspinal muscle strength and tone within normal limits\par o Muscle Tone : paraspinal muscle strength and tone within normal limits\par Tests: straight leg test negative and BRADY test negative bilaterally \par \par Right Lower Extremity\par o Buttock : no tenderness, swelling or deformities \par o Right Hip :\par ¦ Inspection/Palpation : no tenderness, swelling or deformities\par ¦ Range of Motion : full and painless in all planes, no crepitance\par ¦ Stability : joint stability intact\par ¦ Strength : extension, flexion, adduction, abduction, internal rotation and external rotation, 5/5 \par \par o Right Knee :\par ¦ Inspection/Palpation : mild  medial compartment tenderness, no lateral compartment tenderness, no swelling, severe varus deformity\par ¦ Range of Motion : ° with pain on full flexion, no crepitance, decreased patellofemoral glide \par ¦ Stability : valgus / varus instability present on provocative testing\par ¦ Strength : flexion and extension 5/5\par ¦ Tests and Signs : negative Anterior Drawer, negative Lachman, negative Christophe\par \par Left Lower Extremity\par o Buttock : no tenderness, swelling or deformities \par o Left Hip :\par ¦ Inspection/Palpation : no tenderness, no swelling or deformities\par ¦ Range of Motion : full and painless in all planes, no crepitance\par ¦ Stability : joint stability intact\par ¦ Strength : extension, flexion, adduction, abduction, internal rotation and external rotation, 5/5\par \par o Left Knee :moderate to severe medial compartment tenderness, no lateral compartment tenderness, no swelling, slight valgus deformity\par ¦ Range of Motion : 0-110°, no crepitance\par ¦ Stability : mild valgus / varus instability present on provocative testing\par ¦ Strength : flexion and extension 5/5\par ¦ Tests and Signs : negative Anterior Drawer, negative Lachman, negative Christophe\par \par Gait and Station:\par Gait: Varus thrust bilaterally, antalgic, no significant extremity swelling or lymphedema, good proprioception and balance\par \par o Left Knee injection : Indication- knee osteoarthritis, Anatomic location- left intra-articular joint space, Spray - area was sterilized with Betadine and alcohol and anesthetized with Ethyl Chloride, needle used-20G, Medications given- 4cc's Monovisc under Ultrasound guidance, and patient tolerated it well. oLot# [id number]  oExp: 2023-03-31

## 2021-11-03 NOTE — HISTORY OF PRESENT ILLNESS
[de-identified] : 66 year old female presents with bilateral knee pain, right worse than left. She is miserable, and the pain negatively impacts the patient's quality of life and limits normal daily activities. She states that even Diclofenac does not help anymore. Her knee pain causes difficulty ambulating and weightbearing. She understands that she has severe arthritis of both knees and needs to consider knee replacement. She wants to try another round of viscosupplementation. In her right knee, she received cortisone on 10/7/2021 and Monovisc on 10/25/2021. She presents for a Monovisc injection into the left knee today (11/03/2021). Of note, she last had left knee cortisone in 01/2021. Pmhx: She is fully COVID-19 vaccinated.\par \par Radiology Results taken on 10/7/2021:\par o Right Knee : Standing AP, lateral and tunnel views of the knee were obtained and reveal bone on bone in the medial compartment with severe patellofemoral arthritis.\par o Left Knee : Standing AP, lateral and tunnel views of the knee were obtained and reveal bone on bone in the medial compartment with severe patellofemoral arthritis.\par \par Radiology Results done on 1/27/2021:\par o Lumbosacral Spine : AP and lateral views were obtained and reveal diffuse degenerative disc disease worse at L2/L3 and L4/L5 with a grade 1 spondylolisthesis of L4 on L5 and diffuse facet arthropathy, foraminal stenosis at L5/S1.\par o Hip and Pelvis : AP pelvis was obtained and reveal mild degenerative arthritis of both hips, no lytic lesions.

## 2021-11-03 NOTE — DISCUSSION/SUMMARY
[de-identified] : I went over the pathophysiology of the patient's symptoms in great detail with the patient. The patient elected to receive a Monovisc injection into her left knee today, and tolerated it well. I instructed the patient on ROM exercises, and told them to take it easy. The use of ice and rest was reviewed with the patient. The patient may resume activities tomorrow. I reminded the patient that it takes 4 to 6 weeks after the injection to feel symptom relief. All of her questions were answered. She understands and consents to the plan. \par \par FU 6 weeks.\par after a left knee Monovisc injection today (11/03/2021).\par \par The patient is an 66 year old female with bone on bone arthritis of their bilateral knees. Based upon the patient's continued symptoms and failure to respond to conservative treatment I have recommended a total knee replacement for this patient. A long discussion took place with the patient describing what a total joint replacement is and what the procedure would entail. A total knee model, similar to the implant that will be used during the operation was utilized to demonstrate and to discuss the various bearing surfaces of the implants. The hospitalization and post-operative care and rehabilitation were also discussed. The use of perioperative antibiotics and DVT prophylaxis were discussed. The risk, benefits and alternatives to a surgical intervention were discussed at length with the patient. The patient was also advised of risks related to the medical comorbidities and elevated body mass index (BMI). A lengthy discussion took place to review the most common complications including but not limited to: deep vein thrombosis, pulmonary embolus, heart attack, stroke, infection, wound breakdown, numbness, damage to nerves, tendon, muscles, arteries or other blood vessels, death and other possible complications from anesthesia. The patient was told that we will take steps to minimize these risks by using sterile technique, antibiotics and DVT prophylaxis when appropriate and follow the patient postoperatively in the office setting to monitor progress. The possibility of recurrent pain, no improvement in pain and actual worsening of pain were also discussed with the patient.\par \par The discharge plan of care focused on the patient going home following surgery. I encouraged the patient to make the necessary arrangements to have someone stay with them when they are discharged home. Following discharge, a home care nurse will visit the patient. They will open your home care case and request home physical therapy services. Home physical therapy will commence following discharge provided it is appropriate and covered by her health insurance benefit plan.\par \par The risks, benefits and alternative treatment options of total joint replacement were reviewed with the patient at great length. All questions were answered to the satisfaction of the patient. The patient participated in an interactive discussion of the total knee replacement implant planned for their surgery with questions answered. The patient agreed with the treatment plan, and has decided to move forward with the elective total knee replacement as planned.\par \par JUSTIN ESTRADA was seen face to face and needs a commode for bedside use as the patient has no ability to acces the bathroom in their home. The patient also requires a rolling walker as this is needed for activities of daily living within their home secondary to the diagnosis of osteoarthritis.

## 2022-04-01 RX ORDER — DICLOFENAC SODIUM 75 MG/1
75 TABLET, DELAYED RELEASE ORAL
Qty: 60 | Refills: 3 | Status: ACTIVE | COMMUNITY
Start: 2020-08-20 | End: 1900-01-01

## 2023-05-04 ENCOUNTER — APPOINTMENT (OUTPATIENT)
Dept: ULTRASOUND IMAGING | Facility: CLINIC | Age: 68
End: 2023-05-04
Payer: COMMERCIAL

## 2023-05-04 ENCOUNTER — APPOINTMENT (OUTPATIENT)
Dept: MAMMOGRAPHY | Facility: CLINIC | Age: 68
End: 2023-05-04
Payer: COMMERCIAL

## 2023-05-04 PROCEDURE — 76641 ULTRASOUND BREAST COMPLETE: CPT | Mod: 50

## 2023-05-04 PROCEDURE — 77063 BREAST TOMOSYNTHESIS BI: CPT

## 2023-05-04 PROCEDURE — 77067 SCR MAMMO BI INCL CAD: CPT

## 2024-07-24 ENCOUNTER — APPOINTMENT (OUTPATIENT)
Dept: MAMMOGRAPHY | Facility: CLINIC | Age: 69
End: 2024-07-24
Payer: COMMERCIAL

## 2024-07-24 ENCOUNTER — APPOINTMENT (OUTPATIENT)
Dept: ULTRASOUND IMAGING | Facility: CLINIC | Age: 69
End: 2024-07-24
Payer: COMMERCIAL

## 2024-07-24 PROCEDURE — 77067 SCR MAMMO BI INCL CAD: CPT

## 2024-07-24 PROCEDURE — 76641 ULTRASOUND BREAST COMPLETE: CPT | Mod: 50

## 2024-07-24 PROCEDURE — 77063 BREAST TOMOSYNTHESIS BI: CPT

## 2024-10-21 ENCOUNTER — APPOINTMENT (OUTPATIENT)
Dept: ORTHOPEDIC SURGERY | Facility: CLINIC | Age: 69
End: 2024-10-21
Payer: COMMERCIAL

## 2024-10-21 VITALS — WEIGHT: 210 LBS | HEIGHT: 64 IN | BODY MASS INDEX: 35.85 KG/M2

## 2024-10-21 DIAGNOSIS — S82.891S OTHER FRACTURE OF RIGHT LOWER LEG, SEQUELA: ICD-10-CM

## 2024-10-21 DIAGNOSIS — M19.171 POST-TRAUMATIC OSTEOARTHRITIS, RIGHT ANKLE AND FOOT: ICD-10-CM

## 2024-10-21 DIAGNOSIS — M12.579 TRAUMATIC ARTHROPATHY, UNSPECIFIED ANKLE AND FOOT: ICD-10-CM

## 2024-10-21 DIAGNOSIS — M19.071 PRIMARY OSTEOARTHRITIS, RIGHT ANKLE AND FOOT: ICD-10-CM

## 2024-10-21 PROCEDURE — 99204 OFFICE O/P NEW MOD 45 MIN: CPT

## 2024-10-21 PROCEDURE — 73610 X-RAY EXAM OF ANKLE: CPT | Mod: RT

## 2025-04-03 RX ORDER — DICLOFENAC SODIUM 75 MG/1
75 TABLET, DELAYED RELEASE ORAL
Qty: 60 | Refills: 0 | Status: ACTIVE | COMMUNITY
Start: 2025-04-03 | End: 1900-01-01